# Patient Record
Sex: FEMALE
[De-identification: names, ages, dates, MRNs, and addresses within clinical notes are randomized per-mention and may not be internally consistent; named-entity substitution may affect disease eponyms.]

---

## 2024-04-25 PROBLEM — Z00.00 ENCOUNTER FOR PREVENTIVE HEALTH EXAMINATION: Status: ACTIVE | Noted: 2024-04-25

## 2024-05-08 PROBLEM — R51.9 FACIAL PAIN, ACUTE: Status: ACTIVE | Noted: 2024-05-08

## 2024-05-08 PROBLEM — M89.8X8 MASS OF PETROUS TEMPORAL BONE: Status: ACTIVE | Noted: 2024-05-08

## 2024-05-08 RX ORDER — LEVOTHYROXINE SODIUM 137 UG/1
TABLET ORAL
Refills: 0 | Status: ACTIVE | COMMUNITY

## 2024-05-08 RX ORDER — GABAPENTIN 300 MG/1
300 CAPSULE ORAL
Refills: 0 | Status: ACTIVE | COMMUNITY

## 2024-05-08 RX ORDER — NORETHINDRONE ACETATE AND ETHINYL ESTRADIOL AND FERROUS FUMARATE 1MG-20(21)
KIT ORAL
Refills: 0 | Status: ACTIVE | COMMUNITY

## 2024-05-08 RX ORDER — MULTIVITAMIN
TABLET ORAL
Refills: 0 | Status: ACTIVE | COMMUNITY

## 2024-05-08 RX ORDER — ESCITALOPRAM OXALATE 5 MG/1
TABLET, FILM COATED ORAL
Refills: 0 | Status: ACTIVE | COMMUNITY

## 2024-05-09 ENCOUNTER — APPOINTMENT (OUTPATIENT)
Dept: NEUROSURGERY | Facility: CLINIC | Age: 59
End: 2024-05-09
Payer: COMMERCIAL

## 2024-05-09 VITALS
DIASTOLIC BLOOD PRESSURE: 77 MMHG | HEIGHT: 64 IN | WEIGHT: 126 LBS | SYSTOLIC BLOOD PRESSURE: 139 MMHG | OXYGEN SATURATION: 98 % | BODY MASS INDEX: 21.51 KG/M2 | RESPIRATION RATE: 18 BRPM | HEART RATE: 63 BPM

## 2024-05-09 DIAGNOSIS — K13.79 OTHER LESIONS OF ORAL MUCOSA: ICD-10-CM

## 2024-05-09 DIAGNOSIS — M89.8X8 OTHER SPECIFIED DISORDERS OF BONE, OTHER SITE: ICD-10-CM

## 2024-05-09 DIAGNOSIS — K14.8 OTHER DISEASES OF TONGUE: ICD-10-CM

## 2024-05-09 DIAGNOSIS — R51.9 HEADACHE, UNSPECIFIED: ICD-10-CM

## 2024-05-09 PROCEDURE — 99203 OFFICE O/P NEW LOW 30 MIN: CPT

## 2024-05-13 NOTE — ASSESSMENT
[FreeTextEntry1] : I, Dr. Mcbride, personally performed the evaluation and management (E/M) services for this new patient who presents today with (known left petrous apex effusion/cyst and left facial pain. That E/M includes conducting the examination, assessing all new/exacerbated conditions, and establishing a new plan of care. Today, my ACP, Purnima Harper, was here to observe my evaluation and management services for this new problem/exacerbated condition to be followed going forward.  PAIN: -  Surgery to drain fluid discussed, risk and benefits reviewed and all questions answered - pt verbalized understand of surgical option - PST and CHG reviewed and provided

## 2024-05-13 NOTE — HISTORY OF PRESENT ILLNESS
[de-identified] : The patient is a 59 year old female who became having acute left sided constant HA involving her left ear, jaw and tongue in 2021. She was evaluated by ENT and Neurology and tried various pain medications and one day the pain just stopped. The same pain returned in February of this year. She describes it as a persistent dull ache. Chewing, hot or cold does not make it worse. Neurontin was started and has lessened the pain. She also states her left facial coordination feels off.  In 2021: MRA: decreased T1 signal of the medical basal gangliga 2/2024: MRI IAC and CT Temporal bones demonstrated chronic left pertrous apex effusion

## 2024-05-13 NOTE — REASON FOR VISIT
[New Patient Visit] : a new patient visit [Friend] : friend [FreeTextEntry1] : left sided HA, ear and jaw pain left pertrous apex effusion-chronic

## 2024-05-13 NOTE — ADDENDUM
[FreeTextEntry1] :   Patient Name: REINALDO YI   Chief Complaint (CC):   - Patient reported continuous headache, specifically on the left side of the head.   History of Present Illness:   -  Patient, 59 years old, reported having headaches starting from March 2021 to around September or October 2021. The headaches were particularly concentrated on the left side of her head. She sought medical consultation after a month of suffering from the headache, thinking initially that it was due to allergies. During this time an MRI scan revealed a cystic mass behind the patient's temple. The patient had been managing the pain with Aleve and recently started taking Gabapentin, where she noticed a reduction in the pain. From February 2021, she noticed a recurrence of the headache and had a noticeable change in her tongue and face on the left side with occasional numbness and difficulty in chewing or swallowing. The patient reported no loss of hearing. Her occupation involves giving presentations, during which she's noticed difficulty in speaking due to the pain. The current principal concern from the patient is the numbness and weakness which makes chewing hard.   Past Medical History (PMH):   - Patient has a reported history of Fibromyalgia which she currently manages. Moreover, she had a colonoscopy and ablation done in the past.   Family History (FH):   - The patient reported that her  passed away last summer after a long sickness. She has 2 children, one of which is in medical school.   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 She lives alone after the passing of her . It appears she hasn't had much significant disruption in her lifestyle and carries on with her professional duties.   Medications:   - Patient is currently taking Fibromyalgia medication, Lexapro, Synthroid and a hormone replacement for menopause. She has been managing her headaches with Aleve and was recently prescribed Gabapentin.   Allergies:   - None were stated during the conversation.   Review of Systems (ROS):   - Patient reported constant headaches, primarily on the left side of her head, as well as occasional numbness and difficulty in chewing or swallowing.   Physical Examination (PE):   - The physical examination shows that the patient's fifth cranial nerve is normal. The patient has total sensation in her face and productive movement. However, her tongue is atrophic on the left and her palate elevates lesser on the left.   Laboratory/Data Review:   - An MRI scan revealed a cystic mass behind the patient's temple. It has grown 30% larger over the last 3 years and is beginning to put pressure on the brainstem.   Assessment:   - The patient has a benign cystic mass that has shown growth over the last 3 years. The mass is affecting the patient's tongue and causing discomfort in the palate. The condition is leading to ongoing headaches and discomfort in eating or talking. The continued growth of the mass could lead to more severe symptoms.   Plan:   - The patient's treatment plan should ideally focus on the mass. If her symptoms persist or worsen, surgery to explore and possibly decompress the mass should be considered. This option should be weighed against the risks of a brain operation and the possibility that the surgery itself could potentially worsen the patient's condition. The ultimate decision will rely on the patient's choice. In the meantime, the patient should continue to manage her pain with prescribed medication as needed.   Preventive Health:   - Regular monitoring of the cystic mass is advisable to prevent any possible complications if it continues to grow. Additionally, regular check-ups to gauge headache severity and any potential new symptoms are recommended.   Patient Name: REINALDO YI   Chief Complaint (CC):   - Patient arrived with concerns about the noticeable change in the form of her tongue and increasingly severe headache episodes.   History of Present Illness:   - The patient, a woman in her 50s, presented with a significant change in the formation of her tongue and severe headache episodes. These symptoms have progressively worsened and have been ongoing for a very long time. She has a history of recent health and emotional issues related to her condition. Her recent medical interventions include a few sets of MRI scans. The patient's condition appears to be the result of a benign cystic growth at the base of the skull, which is impacting her 12th cranial nerve, and potentially, her 9th, 10th, and 11th cranial nerves.   Past Medical History (PMH):   - Not available in the conversation provided.   Family History (FH):   - Not available in the conversation provided.   Social History (SH):   - Patient may likely be working, seeing as the possibility of working virtually post-surgery was discussed. She also has children.   Medications:   - Not available in the conversation provided.   Allergies:   - Not available in the conversation provided.   Review of Systems (ROS):   - Not available in the conversation provided.   Physical Examination (PE):   - Upon examination, I noted that the patient's tongue appeared corrugated. Using the MRI images, I identified a cystic growth at the base of her skull, which is likely causing displacement of her vertebral artery and the medulla of her brainstem.   Laboratory/Data Review:   - An MRI scan of the patient's brain noted a growth, likely a cyst or benign tumor, located near the base of the skull. This growth seems to be closely associated with the patient's 12th cranial nerve.   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   Plan:   - Due to the risk of further cranial nerve damage and the fact that the tumor is already impacting the patient's quality of life, I suggested the removal of the growth. Though I proposed having an MRI in a year's time to monitor the growth, given the presence of cranial neuropathy, immediate action might be more beneficial. When we decide to have the surgery, it will be a retrosigmoid approach, in which we will attempt to preserve the cranial nerves and remove as much of the tumor as possible. Postoperatively, I would advise the patient to take time off to rest and continue to follow up for monitoring.   Preventive Health:   - Not available in the conversation provided.

## 2024-05-13 NOTE — PHYSICAL EXAM
[Oriented To Time, Place, And Person] : oriented to person, place, and time [Person] : oriented to person [Place] : oriented to place [Time] : oriented to time [Short Term Intact] : short term memory intact [Remote Intact] : remote memory intact [Registration Intact] : recent registration memory intact [Span Intact] : the attention span was normal [Concentration Intact] : normal concentrating ability [Visual Intact] : visual attention was ~T not ~L decreased [Fluency] : fluency intact [Comprehension] : comprehension intact [Reading] : reading intact [Current Events] : adequate knowledge of current events [Past History] : adequate knowledge of personal past history [Vocabulary] : adequate range of vocabulary [Cranial Nerves Optic (II)] : visual acuity intact bilaterally,  pupils equal round and reactive to light [Cranial Nerves Oculomotor (III)] : extraocular motion intact [Cranial Nerves Trigeminal (V)] : facial sensation intact symmetrically [Cranial Nerves Facial (VII)] : face symmetrical [Cranial Nerves Vestibulocochlear (VIII)] : hearing was intact bilaterally [Cranial Nerves Accessory (XI - Cranial And Spinal)] : head turning and shoulder shrug symmetric [Cranial Nerves Hypoglossal (XII)] : there was no tongue deviation with protrusion [Motor Tone] : muscle tone was normal in all four extremities [Motor Strength] : muscle strength was normal in all four extremities [Involuntary Movements] : no involuntary movements were seen [No Muscle Atrophy] : normal bulk in all four extremities [Sensation Tactile Decrease] : light touch was intact [Sensation Pain / Temperature Decrease] : pain and temperature was intact [Abnormal Walk] : normal gait [Balance] : balance was intact [FreeTextEntry5] : tongue atrophic on the left AND palate elevates less on the left

## 2024-05-20 ENCOUNTER — NON-APPOINTMENT (OUTPATIENT)
Age: 59
End: 2024-05-20

## 2024-06-11 ENCOUNTER — APPOINTMENT (OUTPATIENT)
Dept: MRI IMAGING | Facility: HOSPITAL | Age: 59
End: 2024-06-11

## 2024-06-11 ENCOUNTER — OUTPATIENT (OUTPATIENT)
Dept: OUTPATIENT SERVICES | Facility: HOSPITAL | Age: 59
LOS: 1 days | End: 2024-06-11
Payer: COMMERCIAL

## 2024-06-11 PROCEDURE — A9585: CPT

## 2024-06-11 PROCEDURE — 70553 MRI BRAIN STEM W/O & W/DYE: CPT

## 2024-06-11 PROCEDURE — 70553 MRI BRAIN STEM W/O & W/DYE: CPT | Mod: 26

## 2024-06-17 VITALS
HEART RATE: 61 BPM | WEIGHT: 131.18 LBS | TEMPERATURE: 98 F | HEIGHT: 64 IN | OXYGEN SATURATION: 100 % | RESPIRATION RATE: 16 BRPM | SYSTOLIC BLOOD PRESSURE: 117 MMHG | DIASTOLIC BLOOD PRESSURE: 75 MMHG

## 2024-06-18 ENCOUNTER — APPOINTMENT (OUTPATIENT)
Dept: NEUROSURGERY | Facility: HOSPITAL | Age: 59
End: 2024-06-18

## 2024-06-18 ENCOUNTER — TRANSCRIPTION ENCOUNTER (OUTPATIENT)
Age: 59
End: 2024-06-18

## 2024-06-18 PROCEDURE — XXXXX: CPT | Mod: 1L

## 2024-06-18 RX ORDER — POVIDONE-IODINE
1 FLAKES (GRAM) MISCELLANEOUS ONCE
Refills: 0 | Status: COMPLETED | OUTPATIENT
Start: 2024-06-19 | End: 2024-06-19

## 2024-06-19 ENCOUNTER — RESULT REVIEW (OUTPATIENT)
Age: 59
End: 2024-06-19

## 2024-06-19 ENCOUNTER — INPATIENT (INPATIENT)
Facility: HOSPITAL | Age: 59
LOS: 1 days | Discharge: HOME | DRG: 27 | End: 2024-06-21
Attending: NEUROLOGICAL SURGERY | Admitting: NEUROLOGICAL SURGERY
Payer: COMMERCIAL

## 2024-06-19 DIAGNOSIS — Z98.890 OTHER SPECIFIED POSTPROCEDURAL STATES: Chronic | ICD-10-CM

## 2024-06-19 LAB
ADD ON TEST-SPECIMEN IN LAB: SIGNIFICANT CHANGE UP
ANION GAP SERPL CALC-SCNC: 8 MMOL/L — SIGNIFICANT CHANGE UP (ref 5–17)
APTT BLD: 26.1 SEC — SIGNIFICANT CHANGE UP (ref 24.5–35.6)
BLD GP AB SCN SERPL QL: NEGATIVE — SIGNIFICANT CHANGE UP
BUN SERPL-MCNC: 15 MG/DL — SIGNIFICANT CHANGE UP (ref 7–23)
CALCIUM SERPL-MCNC: 8.7 MG/DL — SIGNIFICANT CHANGE UP (ref 8.4–10.5)
CHLORIDE SERPL-SCNC: 103 MMOL/L — SIGNIFICANT CHANGE UP (ref 96–108)
CO2 SERPL-SCNC: 24 MMOL/L — SIGNIFICANT CHANGE UP (ref 22–31)
CREAT SERPL-MCNC: 0.61 MG/DL — SIGNIFICANT CHANGE UP (ref 0.5–1.3)
EGFR: 104 ML/MIN/1.73M2 — SIGNIFICANT CHANGE UP
GLUCOSE SERPL-MCNC: 133 MG/DL — HIGH (ref 70–99)
HCT VFR BLD CALC: 32.8 % — LOW (ref 34.5–45)
HGB BLD-MCNC: 11.1 G/DL — LOW (ref 11.5–15.5)
INR BLD: 1 — SIGNIFICANT CHANGE UP (ref 0.85–1.18)
MAGNESIUM SERPL-MCNC: 2 MG/DL — SIGNIFICANT CHANGE UP (ref 1.6–2.6)
MCHC RBC-ENTMCNC: 32.6 PG — SIGNIFICANT CHANGE UP (ref 27–34)
MCHC RBC-ENTMCNC: 33.8 GM/DL — SIGNIFICANT CHANGE UP (ref 32–36)
MCV RBC AUTO: 96.2 FL — SIGNIFICANT CHANGE UP (ref 80–100)
NRBC # BLD: 0 /100 WBCS — SIGNIFICANT CHANGE UP (ref 0–0)
PHOSPHATE SERPL-MCNC: 4.5 MG/DL — SIGNIFICANT CHANGE UP (ref 2.5–4.5)
PLATELET # BLD AUTO: 161 K/UL — SIGNIFICANT CHANGE UP (ref 150–400)
POTASSIUM SERPL-MCNC: 4.2 MMOL/L — SIGNIFICANT CHANGE UP (ref 3.5–5.3)
POTASSIUM SERPL-SCNC: 4.2 MMOL/L — SIGNIFICANT CHANGE UP (ref 3.5–5.3)
PROTHROM AB SERPL-ACNC: 11.4 SEC — SIGNIFICANT CHANGE UP (ref 9.5–13)
RBC # BLD: 3.41 M/UL — LOW (ref 3.8–5.2)
RBC # FLD: 12.5 % — SIGNIFICANT CHANGE UP (ref 10.3–14.5)
RH IG SCN BLD-IMP: NEGATIVE — SIGNIFICANT CHANGE UP
SODIUM SERPL-SCNC: 135 MMOL/L — SIGNIFICANT CHANGE UP (ref 135–145)
WBC # BLD: 4.76 K/UL — SIGNIFICANT CHANGE UP (ref 3.8–10.5)
WBC # FLD AUTO: 4.76 K/UL — SIGNIFICANT CHANGE UP (ref 3.8–10.5)

## 2024-06-19 PROCEDURE — 61781 SCAN PROC CRANIAL INTRA: CPT

## 2024-06-19 PROCEDURE — 88307 TISSUE EXAM BY PATHOLOGIST: CPT | Mod: 26

## 2024-06-19 PROCEDURE — 99291 CRITICAL CARE FIRST HOUR: CPT

## 2024-06-19 PROCEDURE — 61520 REMOVAL OF BRAIN LESION: CPT

## 2024-06-19 DEVICE — DURASEAL: Type: IMPLANTABLE DEVICE | Status: FUNCTIONAL

## 2024-06-19 DEVICE — SURGCEL 4 X 8": Type: IMPLANTABLE DEVICE | Status: FUNCTIONAL

## 2024-06-19 DEVICE — SURGICEL FIBRILLAR 4 X 4": Type: IMPLANTABLE DEVICE | Status: FUNCTIONAL

## 2024-06-19 DEVICE — SURGIFOAM PAD 8CM X 12.5CM X 10MM (100): Type: IMPLANTABLE DEVICE | Status: FUNCTIONAL

## 2024-06-19 DEVICE — SURGIFLO HEMOSTATIC MATRIX KIT: Type: IMPLANTABLE DEVICE | Status: FUNCTIONAL

## 2024-06-19 DEVICE — MESH DYNAMIC 1.7MM 90X90X.3MM: Type: IMPLANTABLE DEVICE | Status: FUNCTIONAL

## 2024-06-19 DEVICE — SCREW UN3 AXS SELF DRILL 1.5X4MM: Type: IMPLANTABLE DEVICE | Status: FUNCTIONAL

## 2024-06-19 DEVICE — TACHOSIL 4.8 X 4.8CM: Type: IMPLANTABLE DEVICE | Status: FUNCTIONAL

## 2024-06-19 RX ORDER — SODIUM CHLORIDE 0.9 % (FLUSH) 0.9 %
500 SYRINGE (ML) INJECTION ONCE
Refills: 0 | Status: COMPLETED | OUTPATIENT
Start: 2024-06-19 | End: 2024-06-19

## 2024-06-19 RX ORDER — DEXTROSE 30 % IN WATER 30 %
12.5 VIAL (ML) INTRAVENOUS ONCE
Refills: 0 | Status: DISCONTINUED | OUTPATIENT
Start: 2024-06-19 | End: 2024-06-19

## 2024-06-19 RX ORDER — ACETAMINOPHEN 325 MG
1000 TABLET ORAL ONCE
Refills: 0 | Status: COMPLETED | OUTPATIENT
Start: 2024-06-19 | End: 2024-06-19

## 2024-06-19 RX ORDER — GLUCAGON HYDROCHLORIDE 1 MG/ML
1 INJECTION, POWDER, FOR SOLUTION INTRAMUSCULAR; INTRAVENOUS; SUBCUTANEOUS ONCE
Refills: 0 | Status: DISCONTINUED | OUTPATIENT
Start: 2024-06-19 | End: 2024-06-19

## 2024-06-19 RX ORDER — METHOCARBAMOL 500 MG
500 TABLET ORAL EVERY 8 HOURS
Refills: 0 | Status: DISCONTINUED | OUTPATIENT
Start: 2024-06-19 | End: 2024-06-21

## 2024-06-19 RX ORDER — CLINDAMYCIN PHOSPHATE 150 MG/ML
INJECTION, SOLUTION INTRAVENOUS
Refills: 0 | Status: COMPLETED | OUTPATIENT
Start: 2024-06-19 | End: 2024-06-19

## 2024-06-19 RX ORDER — ACETAMINOPHEN 325 MG
1000 TABLET ORAL EVERY 8 HOURS
Refills: 0 | Status: DISCONTINUED | OUTPATIENT
Start: 2024-06-19 | End: 2024-06-19

## 2024-06-19 RX ORDER — CLINDAMYCIN PHOSPHATE 150 MG/ML
900 INJECTION, SOLUTION INTRAVENOUS ONCE
Refills: 0 | Status: COMPLETED | OUTPATIENT
Start: 2024-06-19 | End: 2024-06-19

## 2024-06-19 RX ORDER — ONDANSETRON HYDROCHLORIDE 2 MG/ML
4 INJECTION INTRAMUSCULAR; INTRAVENOUS EVERY 6 HOURS
Refills: 0 | Status: DISCONTINUED | OUTPATIENT
Start: 2024-06-19 | End: 2024-06-19

## 2024-06-19 RX ORDER — LEVOTHYROXINE SODIUM 25 MCG
1 TABLET ORAL
Refills: 0 | DISCHARGE

## 2024-06-19 RX ORDER — ESCITALOPRAM OXALATE 20 MG/1
10 TABLET, FILM COATED ORAL DAILY
Refills: 0 | Status: DISCONTINUED | OUTPATIENT
Start: 2024-06-19 | End: 2024-06-21

## 2024-06-19 RX ORDER — ESCITALOPRAM OXALATE 20 MG/1
1 TABLET, FILM COATED ORAL
Refills: 0 | DISCHARGE

## 2024-06-19 RX ORDER — PANTOPRAZOLE SODIUM 40 MG/10ML
40 INJECTION, POWDER, FOR SOLUTION INTRAVENOUS DAILY
Refills: 0 | Status: DISCONTINUED | OUTPATIENT
Start: 2024-06-19 | End: 2024-06-19

## 2024-06-19 RX ORDER — BISACODYL 5 MG
5 TABLET, DELAYED RELEASE (ENTERIC COATED) ORAL DAILY
Refills: 0 | Status: DISCONTINUED | OUTPATIENT
Start: 2024-06-19 | End: 2024-06-21

## 2024-06-19 RX ORDER — LEVOTHYROXINE SODIUM 25 MCG
50 TABLET ORAL DAILY
Refills: 0 | Status: DISCONTINUED | OUTPATIENT
Start: 2024-06-19 | End: 2024-06-21

## 2024-06-19 RX ORDER — DEXTROSE 30 % IN WATER 30 %
15 VIAL (ML) INTRAVENOUS ONCE
Refills: 0 | Status: DISCONTINUED | OUTPATIENT
Start: 2024-06-19 | End: 2024-06-19

## 2024-06-19 RX ORDER — APREPITANT 125MG-80MG
40 KIT ORAL ONCE
Refills: 0 | Status: COMPLETED | OUTPATIENT
Start: 2024-06-19 | End: 2024-06-19

## 2024-06-19 RX ORDER — ESTRADIOL/NORETHINDRONE ACETATE 1; .5 MG/1; MG/1
1 TABLET, FILM COATED ORAL
Refills: 0 | DISCHARGE

## 2024-06-19 RX ORDER — CLINDAMYCIN PHOSPHATE 150 MG/ML
900 INJECTION, SOLUTION INTRAVENOUS EVERY 8 HOURS
Refills: 0 | Status: COMPLETED | OUTPATIENT
Start: 2024-06-19 | End: 2024-06-19

## 2024-06-19 RX ORDER — DEXTROSE MONOHYDRATE AND SODIUM CHLORIDE 5; .3 G/100ML; G/100ML
1000 INJECTION, SOLUTION INTRAVENOUS
Refills: 0 | Status: DISCONTINUED | OUTPATIENT
Start: 2024-06-19 | End: 2024-06-19

## 2024-06-19 RX ORDER — SODIUM CHLORIDE 0.9 % (FLUSH) 0.9 %
1000 SYRINGE (ML) INJECTION
Refills: 0 | Status: DISCONTINUED | OUTPATIENT
Start: 2024-06-19 | End: 2024-06-20

## 2024-06-19 RX ORDER — METOCLOPRAMIDE 5 MG/5ML
10 SOLUTION ORAL ONCE
Refills: 0 | Status: COMPLETED | OUTPATIENT
Start: 2024-06-19 | End: 2024-06-19

## 2024-06-19 RX ORDER — DEXTROSE MONOHYDRATE 100 MG/ML
125 INJECTION, SOLUTION INTRAVENOUS ONCE
Refills: 0 | Status: DISCONTINUED | OUTPATIENT
Start: 2024-06-19 | End: 2024-06-19

## 2024-06-19 RX ORDER — METOCLOPRAMIDE 5 MG/5ML
10 SOLUTION ORAL EVERY 8 HOURS
Refills: 0 | Status: DISCONTINUED | OUTPATIENT
Start: 2024-06-19 | End: 2024-06-21

## 2024-06-19 RX ORDER — SODIUM CHLORIDE 0.9 % (FLUSH) 0.9 %
1000 SYRINGE (ML) INJECTION
Refills: 0 | Status: DISCONTINUED | OUTPATIENT
Start: 2024-06-19 | End: 2024-06-19

## 2024-06-19 RX ORDER — ONDANSETRON HYDROCHLORIDE 2 MG/ML
4 INJECTION INTRAMUSCULAR; INTRAVENOUS EVERY 6 HOURS
Refills: 0 | Status: COMPLETED | OUTPATIENT
Start: 2024-06-19 | End: 2024-06-21

## 2024-06-19 RX ORDER — ACETAMINOPHEN 325 MG
1000 TABLET ORAL EVERY 8 HOURS
Refills: 0 | Status: DISCONTINUED | OUTPATIENT
Start: 2024-06-20 | End: 2024-06-21

## 2024-06-19 RX ORDER — DEXTROSE 30 % IN WATER 30 %
25 VIAL (ML) INTRAVENOUS ONCE
Refills: 0 | Status: DISCONTINUED | OUTPATIENT
Start: 2024-06-19 | End: 2024-06-19

## 2024-06-19 RX ORDER — KETOROLAC TROMETHAMINE 30 MG/ML
15 INJECTION, SOLUTION INTRAMUSCULAR EVERY 6 HOURS
Refills: 0 | Status: DISCONTINUED | OUTPATIENT
Start: 2024-06-19 | End: 2024-06-21

## 2024-06-19 RX ORDER — DIAZEPAM 10 MG/1
2 TABLET ORAL ONCE
Refills: 0 | Status: DISCONTINUED | OUTPATIENT
Start: 2024-06-19 | End: 2024-06-19

## 2024-06-19 RX ORDER — SENNOSIDES 8.6 MG
2 TABLET ORAL AT BEDTIME
Refills: 0 | Status: DISCONTINUED | OUTPATIENT
Start: 2024-06-19 | End: 2024-06-21

## 2024-06-19 RX ADMIN — DIAZEPAM 2 MILLIGRAM(S): 10 TABLET ORAL at 19:56

## 2024-06-19 RX ADMIN — Medication 1000 MILLIGRAM(S): at 22:24

## 2024-06-19 RX ADMIN — METOCLOPRAMIDE 10 MILLIGRAM(S): 5 SOLUTION ORAL at 15:06

## 2024-06-19 RX ADMIN — Medication 500 MILLILITER(S): at 14:27

## 2024-06-19 RX ADMIN — Medication 2 TABLET(S): at 22:10

## 2024-06-19 RX ADMIN — Medication 400 MILLIGRAM(S): at 22:09

## 2024-06-19 RX ADMIN — Medication 1000 MILLIGRAM(S): at 15:12

## 2024-06-19 RX ADMIN — APREPITANT 40 MILLIGRAM(S): KIT at 06:32

## 2024-06-19 RX ADMIN — CLINDAMYCIN PHOSPHATE 100 MILLIGRAM(S): 150 INJECTION, SOLUTION INTRAVENOUS at 16:52

## 2024-06-19 RX ADMIN — Medication 1000 MILLIGRAM(S): at 06:31

## 2024-06-19 RX ADMIN — Medication 400 MILLIGRAM(S): at 14:34

## 2024-06-19 RX ADMIN — Medication 1000 MILLILITER(S): at 15:06

## 2024-06-19 RX ADMIN — Medication 75 MILLILITER(S): at 14:00

## 2024-06-19 RX ADMIN — Medication 1 APPLICATION(S): at 06:41

## 2024-06-19 RX ADMIN — CLINDAMYCIN PHOSPHATE 100 MILLIGRAM(S): 150 INJECTION, SOLUTION INTRAVENOUS at 22:09

## 2024-06-19 RX ADMIN — Medication 500 MILLIGRAM(S): at 22:09

## 2024-06-19 RX ADMIN — ONDANSETRON HYDROCHLORIDE 4 MILLIGRAM(S): 2 INJECTION INTRAMUSCULAR; INTRAVENOUS at 23:49

## 2024-06-19 RX ADMIN — Medication 1 APPLICATION(S): at 06:25

## 2024-06-20 LAB
A1C WITH ESTIMATED AVERAGE GLUCOSE RESULT: 5.4 % — SIGNIFICANT CHANGE UP (ref 4–5.6)
ANION GAP SERPL CALC-SCNC: 8 MMOL/L — SIGNIFICANT CHANGE UP (ref 5–17)
BUN SERPL-MCNC: 14 MG/DL — SIGNIFICANT CHANGE UP (ref 7–23)
CALCIUM SERPL-MCNC: 8.4 MG/DL — SIGNIFICANT CHANGE UP (ref 8.4–10.5)
CHLORIDE SERPL-SCNC: 104 MMOL/L — SIGNIFICANT CHANGE UP (ref 96–108)
CO2 SERPL-SCNC: 23 MMOL/L — SIGNIFICANT CHANGE UP (ref 22–31)
CREAT SERPL-MCNC: 0.58 MG/DL — SIGNIFICANT CHANGE UP (ref 0.5–1.3)
EGFR: 105 ML/MIN/1.73M2 — SIGNIFICANT CHANGE UP
ESTIMATED AVERAGE GLUCOSE: 108 MG/DL — SIGNIFICANT CHANGE UP (ref 68–114)
GLUCOSE SERPL-MCNC: 112 MG/DL — HIGH (ref 70–99)
HCT VFR BLD CALC: 32.5 % — LOW (ref 34.5–45)
HGB BLD-MCNC: 11.2 G/DL — LOW (ref 11.5–15.5)
MAGNESIUM SERPL-MCNC: 1.8 MG/DL — SIGNIFICANT CHANGE UP (ref 1.6–2.6)
MCHC RBC-ENTMCNC: 32.9 PG — SIGNIFICANT CHANGE UP (ref 27–34)
MCHC RBC-ENTMCNC: 34.5 GM/DL — SIGNIFICANT CHANGE UP (ref 32–36)
MCV RBC AUTO: 95.6 FL — SIGNIFICANT CHANGE UP (ref 80–100)
NRBC # BLD: 0 /100 WBCS — SIGNIFICANT CHANGE UP (ref 0–0)
PHOSPHATE SERPL-MCNC: 4.3 MG/DL — SIGNIFICANT CHANGE UP (ref 2.5–4.5)
PLATELET # BLD AUTO: 158 K/UL — SIGNIFICANT CHANGE UP (ref 150–400)
POTASSIUM SERPL-MCNC: 3.9 MMOL/L — SIGNIFICANT CHANGE UP (ref 3.5–5.3)
POTASSIUM SERPL-SCNC: 3.9 MMOL/L — SIGNIFICANT CHANGE UP (ref 3.5–5.3)
RBC # BLD: 3.4 M/UL — LOW (ref 3.8–5.2)
RBC # FLD: 12.6 % — SIGNIFICANT CHANGE UP (ref 10.3–14.5)
SODIUM SERPL-SCNC: 135 MMOL/L — SIGNIFICANT CHANGE UP (ref 135–145)
WBC # BLD: 7.42 K/UL — SIGNIFICANT CHANGE UP (ref 3.8–10.5)
WBC # FLD AUTO: 7.42 K/UL — SIGNIFICANT CHANGE UP (ref 3.8–10.5)

## 2024-06-20 PROCEDURE — 70450 CT HEAD/BRAIN W/O DYE: CPT | Mod: 26

## 2024-06-20 RX ORDER — MAGNESIUM SULFATE 100 %
2 POWDER (GRAM) MISCELLANEOUS ONCE
Refills: 0 | Status: COMPLETED | OUTPATIENT
Start: 2024-06-20 | End: 2024-06-20

## 2024-06-20 RX ORDER — ENOXAPARIN SODIUM 100 MG/ML
40 INJECTION SUBCUTANEOUS
Refills: 0 | Status: DISCONTINUED | OUTPATIENT
Start: 2024-06-20 | End: 2024-06-21

## 2024-06-20 RX ORDER — DIAZEPAM 10 MG/1
2 TABLET ORAL ONCE
Refills: 0 | Status: DISCONTINUED | OUTPATIENT
Start: 2024-06-20 | End: 2024-06-21

## 2024-06-20 RX ORDER — POTASSIUM CHLORIDE 600 MG/1
20 TABLET, FILM COATED, EXTENDED RELEASE ORAL ONCE
Refills: 0 | Status: COMPLETED | OUTPATIENT
Start: 2024-06-20 | End: 2024-06-20

## 2024-06-20 RX ADMIN — KETOROLAC TROMETHAMINE 15 MILLIGRAM(S): 30 INJECTION, SOLUTION INTRAMUSCULAR at 21:08

## 2024-06-20 RX ADMIN — KETOROLAC TROMETHAMINE 15 MILLIGRAM(S): 30 INJECTION, SOLUTION INTRAMUSCULAR at 22:02

## 2024-06-20 RX ADMIN — Medication 500 MILLIGRAM(S): at 06:16

## 2024-06-20 RX ADMIN — Medication 1000 MILLIGRAM(S): at 06:16

## 2024-06-20 RX ADMIN — KETOROLAC TROMETHAMINE 15 MILLIGRAM(S): 30 INJECTION, SOLUTION INTRAMUSCULAR at 13:58

## 2024-06-20 RX ADMIN — POTASSIUM CHLORIDE 20 MILLIEQUIVALENT(S): 600 TABLET, FILM COATED, EXTENDED RELEASE ORAL at 07:07

## 2024-06-20 RX ADMIN — Medication 50 MICROGRAM(S): at 06:16

## 2024-06-20 RX ADMIN — ONDANSETRON HYDROCHLORIDE 4 MILLIGRAM(S): 2 INJECTION INTRAMUSCULAR; INTRAVENOUS at 06:16

## 2024-06-20 RX ADMIN — ONDANSETRON HYDROCHLORIDE 4 MILLIGRAM(S): 2 INJECTION INTRAMUSCULAR; INTRAVENOUS at 18:14

## 2024-06-20 RX ADMIN — ONDANSETRON HYDROCHLORIDE 4 MILLIGRAM(S): 2 INJECTION INTRAMUSCULAR; INTRAVENOUS at 23:20

## 2024-06-20 RX ADMIN — ENOXAPARIN SODIUM 40 MILLIGRAM(S): 100 INJECTION SUBCUTANEOUS at 21:07

## 2024-06-20 RX ADMIN — Medication 1000 MILLIGRAM(S): at 13:58

## 2024-06-20 RX ADMIN — Medication 25 GRAM(S): at 07:07

## 2024-06-20 RX ADMIN — Medication 1000 MILLIGRAM(S): at 14:58

## 2024-06-20 RX ADMIN — KETOROLAC TROMETHAMINE 15 MILLIGRAM(S): 30 INJECTION, SOLUTION INTRAMUSCULAR at 14:13

## 2024-06-20 RX ADMIN — Medication 1000 MILLIGRAM(S): at 06:33

## 2024-06-20 RX ADMIN — ESCITALOPRAM OXALATE 10 MILLIGRAM(S): 20 TABLET, FILM COATED ORAL at 11:32

## 2024-06-20 RX ADMIN — Medication 500 MILLIGRAM(S): at 21:08

## 2024-06-20 RX ADMIN — ONDANSETRON HYDROCHLORIDE 4 MILLIGRAM(S): 2 INJECTION INTRAMUSCULAR; INTRAVENOUS at 11:32

## 2024-06-20 RX ADMIN — Medication 1000 MILLIGRAM(S): at 21:08

## 2024-06-20 RX ADMIN — Medication 1 APPLICATION(S): at 06:18

## 2024-06-20 RX ADMIN — Medication 500 MILLIGRAM(S): at 13:58

## 2024-06-21 ENCOUNTER — TRANSCRIPTION ENCOUNTER (OUTPATIENT)
Age: 59
End: 2024-06-21

## 2024-06-21 VITALS
HEART RATE: 86 BPM | DIASTOLIC BLOOD PRESSURE: 72 MMHG | SYSTOLIC BLOOD PRESSURE: 131 MMHG | OXYGEN SATURATION: 98 % | TEMPERATURE: 98 F | RESPIRATION RATE: 18 BRPM

## 2024-06-21 DIAGNOSIS — M89.9 DISORDER OF BONE, UNSPECIFIED: ICD-10-CM

## 2024-06-21 PROBLEM — I34.1 NONRHEUMATIC MITRAL (VALVE) PROLAPSE: Chronic | Status: ACTIVE | Noted: 2024-06-17

## 2024-06-21 PROBLEM — M19.90 UNSPECIFIED OSTEOARTHRITIS, UNSPECIFIED SITE: Chronic | Status: ACTIVE | Noted: 2024-06-17

## 2024-06-21 PROBLEM — E03.9 HYPOTHYROIDISM, UNSPECIFIED: Chronic | Status: ACTIVE | Noted: 2024-06-17

## 2024-06-21 PROBLEM — Z87.898 PERSONAL HISTORY OF OTHER SPECIFIED CONDITIONS: Chronic | Status: ACTIVE | Noted: 2024-06-17

## 2024-06-21 PROBLEM — G50.1 ATYPICAL FACIAL PAIN: Chronic | Status: ACTIVE | Noted: 2024-06-17

## 2024-06-21 PROBLEM — N80.9 ENDOMETRIOSIS, UNSPECIFIED: Chronic | Status: ACTIVE | Noted: 2024-06-19

## 2024-06-21 LAB
ANION GAP SERPL CALC-SCNC: 7 MMOL/L — SIGNIFICANT CHANGE UP (ref 5–17)
BUN SERPL-MCNC: 11 MG/DL — SIGNIFICANT CHANGE UP (ref 7–23)
CALCIUM SERPL-MCNC: 8.8 MG/DL — SIGNIFICANT CHANGE UP (ref 8.4–10.5)
CHLORIDE SERPL-SCNC: 105 MMOL/L — SIGNIFICANT CHANGE UP (ref 96–108)
CO2 SERPL-SCNC: 26 MMOL/L — SIGNIFICANT CHANGE UP (ref 22–31)
CREAT SERPL-MCNC: 0.63 MG/DL — SIGNIFICANT CHANGE UP (ref 0.5–1.3)
EGFR: 103 ML/MIN/1.73M2 — SIGNIFICANT CHANGE UP
GLUCOSE SERPL-MCNC: 121 MG/DL — HIGH (ref 70–99)
HCT VFR BLD CALC: 36.4 % — SIGNIFICANT CHANGE UP (ref 34.5–45)
HGB BLD-MCNC: 11.8 G/DL — SIGNIFICANT CHANGE UP (ref 11.5–15.5)
MAGNESIUM SERPL-MCNC: 2.1 MG/DL — SIGNIFICANT CHANGE UP (ref 1.6–2.6)
MCHC RBC-ENTMCNC: 32.1 PG — SIGNIFICANT CHANGE UP (ref 27–34)
MCHC RBC-ENTMCNC: 32.4 GM/DL — SIGNIFICANT CHANGE UP (ref 32–36)
MCV RBC AUTO: 98.9 FL — SIGNIFICANT CHANGE UP (ref 80–100)
NRBC # BLD: 0 /100 WBCS — SIGNIFICANT CHANGE UP (ref 0–0)
PHOSPHATE SERPL-MCNC: 2.7 MG/DL — SIGNIFICANT CHANGE UP (ref 2.5–4.5)
PLATELET # BLD AUTO: 169 K/UL — SIGNIFICANT CHANGE UP (ref 150–400)
POTASSIUM SERPL-MCNC: 4 MMOL/L — SIGNIFICANT CHANGE UP (ref 3.5–5.3)
POTASSIUM SERPL-SCNC: 4 MMOL/L — SIGNIFICANT CHANGE UP (ref 3.5–5.3)
RBC # BLD: 3.68 M/UL — LOW (ref 3.8–5.2)
RBC # FLD: 12.5 % — SIGNIFICANT CHANGE UP (ref 10.3–14.5)
SODIUM SERPL-SCNC: 138 MMOL/L — SIGNIFICANT CHANGE UP (ref 135–145)
WBC # BLD: 5.57 K/UL — SIGNIFICANT CHANGE UP (ref 3.8–10.5)
WBC # FLD AUTO: 5.57 K/UL — SIGNIFICANT CHANGE UP (ref 3.8–10.5)

## 2024-06-21 PROCEDURE — C1889: CPT

## 2024-06-21 PROCEDURE — 86900 BLOOD TYPING SEROLOGIC ABO: CPT

## 2024-06-21 PROCEDURE — 86901 BLOOD TYPING SEROLOGIC RH(D): CPT

## 2024-06-21 PROCEDURE — 80048 BASIC METABOLIC PNL TOTAL CA: CPT

## 2024-06-21 PROCEDURE — 83036 HEMOGLOBIN GLYCOSYLATED A1C: CPT

## 2024-06-21 PROCEDURE — 99024 POSTOP FOLLOW-UP VISIT: CPT

## 2024-06-21 PROCEDURE — 99222 1ST HOSP IP/OBS MODERATE 55: CPT

## 2024-06-21 PROCEDURE — 85027 COMPLETE CBC AUTOMATED: CPT

## 2024-06-21 PROCEDURE — 86850 RBC ANTIBODY SCREEN: CPT

## 2024-06-21 PROCEDURE — 88307 TISSUE EXAM BY PATHOLOGIST: CPT

## 2024-06-21 PROCEDURE — 86923 COMPATIBILITY TEST ELECTRIC: CPT

## 2024-06-21 PROCEDURE — 83735 ASSAY OF MAGNESIUM: CPT

## 2024-06-21 PROCEDURE — 36415 COLL VENOUS BLD VENIPUNCTURE: CPT

## 2024-06-21 PROCEDURE — 85610 PROTHROMBIN TIME: CPT

## 2024-06-21 PROCEDURE — C1713: CPT

## 2024-06-21 PROCEDURE — 85730 THROMBOPLASTIN TIME PARTIAL: CPT

## 2024-06-21 PROCEDURE — 70450 CT HEAD/BRAIN W/O DYE: CPT | Mod: MC

## 2024-06-21 PROCEDURE — 84100 ASSAY OF PHOSPHORUS: CPT

## 2024-06-21 RX ORDER — ONDANSETRON HYDROCHLORIDE 2 MG/ML
1 INJECTION INTRAMUSCULAR; INTRAVENOUS
Qty: 42 | Refills: 0
Start: 2024-06-21 | End: 2024-07-04

## 2024-06-21 RX ORDER — SENNOSIDES 8.6 MG
2 TABLET ORAL
Qty: 0 | Refills: 0 | DISCHARGE
Start: 2024-06-21

## 2024-06-21 RX ORDER — DIAZEPAM 10 MG/1
2 TABLET ORAL EVERY 8 HOURS
Refills: 0 | Status: DISCONTINUED | OUTPATIENT
Start: 2024-06-21 | End: 2024-06-21

## 2024-06-21 RX ORDER — HYDRALAZINE HYDROCHLORIDE 50 MG/1
10 TABLET ORAL ONCE
Refills: 0 | Status: COMPLETED | OUTPATIENT
Start: 2024-06-21 | End: 2024-06-21

## 2024-06-21 RX ORDER — TRAMADOL HYDROCHLORIDE 50 MG/1
1 TABLET, FILM COATED ORAL
Qty: 28 | Refills: 0
Start: 2024-06-21 | End: 2024-06-27

## 2024-06-21 RX ORDER — NALOXONE HYDROCHLORIDE 1 MG/ML
1 INJECTION PARENTERAL
Qty: 1 | Refills: 0
Start: 2024-06-21 | End: 2024-06-21

## 2024-06-21 RX ORDER — DIAZEPAM 10 MG/1
1 TABLET ORAL
Qty: 28 | Refills: 0
Start: 2024-06-21 | End: 2024-06-27

## 2024-06-21 RX ORDER — ACETAMINOPHEN 325 MG
2 TABLET ORAL
Qty: 0 | Refills: 0 | DISCHARGE
Start: 2024-06-21

## 2024-06-21 RX ADMIN — ONDANSETRON HYDROCHLORIDE 4 MILLIGRAM(S): 2 INJECTION INTRAMUSCULAR; INTRAVENOUS at 13:51

## 2024-06-21 RX ADMIN — HYDRALAZINE HYDROCHLORIDE 10 MILLIGRAM(S): 50 TABLET ORAL at 06:55

## 2024-06-21 RX ADMIN — Medication 500 MILLIGRAM(S): at 06:20

## 2024-06-21 RX ADMIN — KETOROLAC TROMETHAMINE 15 MILLIGRAM(S): 30 INJECTION, SOLUTION INTRAMUSCULAR at 04:29

## 2024-06-21 RX ADMIN — Medication 1000 MILLIGRAM(S): at 13:50

## 2024-06-21 RX ADMIN — ONDANSETRON HYDROCHLORIDE 4 MILLIGRAM(S): 2 INJECTION INTRAMUSCULAR; INTRAVENOUS at 06:21

## 2024-06-21 RX ADMIN — ESCITALOPRAM OXALATE 10 MILLIGRAM(S): 20 TABLET, FILM COATED ORAL at 13:50

## 2024-06-21 RX ADMIN — KETOROLAC TROMETHAMINE 15 MILLIGRAM(S): 30 INJECTION, SOLUTION INTRAMUSCULAR at 05:10

## 2024-06-21 RX ADMIN — Medication 1000 MILLIGRAM(S): at 06:20

## 2024-06-21 RX ADMIN — Medication 50 MICROGRAM(S): at 06:20

## 2024-06-21 RX ADMIN — METOCLOPRAMIDE 10 MILLIGRAM(S): 5 SOLUTION ORAL at 04:21

## 2024-06-24 ENCOUNTER — NON-APPOINTMENT (OUTPATIENT)
Age: 59
End: 2024-06-24

## 2024-06-26 LAB — SURGICAL PATHOLOGY STUDY: SIGNIFICANT CHANGE UP

## 2024-06-27 PROBLEM — Z87.39 PERSONAL HISTORY OF OTHER DISEASES OF THE MUSCULOSKELETAL SYSTEM AND CONNECTIVE TISSUE: Chronic | Status: ACTIVE | Noted: 2024-06-17

## 2024-06-27 PROBLEM — L30.9 DERMATITIS, UNSPECIFIED: Chronic | Status: ACTIVE | Noted: 2024-06-17

## 2024-07-02 DIAGNOSIS — N80.9 ENDOMETRIOSIS, UNSPECIFIED: ICD-10-CM

## 2024-07-02 DIAGNOSIS — G93.0 CEREBRAL CYSTS: ICD-10-CM

## 2024-07-02 DIAGNOSIS — E03.9 HYPOTHYROIDISM, UNSPECIFIED: ICD-10-CM

## 2024-07-02 DIAGNOSIS — G52.3 DISORDERS OF HYPOGLOSSAL NERVE: ICD-10-CM

## 2024-07-02 DIAGNOSIS — D49.2 NEOPLASM OF UNSPECIFIED BEHAVIOR OF BONE, SOFT TISSUE, AND SKIN: ICD-10-CM

## 2024-07-02 DIAGNOSIS — K14.8 OTHER DISEASES OF TONGUE: ICD-10-CM

## 2024-07-03 ENCOUNTER — APPOINTMENT (OUTPATIENT)
Dept: NEUROSURGERY | Facility: CLINIC | Age: 59
End: 2024-07-03
Payer: COMMERCIAL

## 2024-07-03 PROCEDURE — 99441: CPT

## 2024-07-03 RX ORDER — DIAZEPAM 5 MG/ML
5 AMPUL (ML) INJECTION
Refills: 0 | Status: ACTIVE | COMMUNITY

## 2024-07-08 RX ORDER — METHOCARBAMOL 500 MG/1
500 TABLET, FILM COATED ORAL
Refills: 0 | Status: DISCONTINUED | COMMUNITY
End: 2024-07-08

## 2024-07-09 RX ORDER — METHOCARBAMOL 500 MG/1
500 TABLET, FILM COATED ORAL
Qty: 60 | Refills: 0 | Status: DISCONTINUED | COMMUNITY
Start: 2024-07-03 | End: 2024-07-09

## 2024-07-10 ENCOUNTER — APPOINTMENT (OUTPATIENT)
Dept: NEUROSURGERY | Facility: CLINIC | Age: 59
End: 2024-07-10
Payer: COMMERCIAL

## 2024-07-10 ENCOUNTER — APPOINTMENT (OUTPATIENT)
Dept: MRI IMAGING | Facility: HOSPITAL | Age: 59
End: 2024-07-10

## 2024-07-10 ENCOUNTER — OUTPATIENT (OUTPATIENT)
Dept: OUTPATIENT SERVICES | Facility: HOSPITAL | Age: 59
LOS: 1 days | End: 2024-07-10
Payer: COMMERCIAL

## 2024-07-10 VITALS
TEMPERATURE: 98 F | RESPIRATION RATE: 18 BRPM | WEIGHT: 126 LBS | HEIGHT: 64 IN | DIASTOLIC BLOOD PRESSURE: 83 MMHG | HEART RATE: 63 BPM | SYSTOLIC BLOOD PRESSURE: 147 MMHG | BODY MASS INDEX: 21.51 KG/M2 | OXYGEN SATURATION: 98 %

## 2024-07-10 DIAGNOSIS — Z98.890 OTHER SPECIFIED POSTPROCEDURAL STATES: Chronic | ICD-10-CM

## 2024-07-10 DIAGNOSIS — K14.8 OTHER DISEASES OF TONGUE: ICD-10-CM

## 2024-07-10 DIAGNOSIS — R51.9 HEADACHE, UNSPECIFIED: ICD-10-CM

## 2024-07-10 DIAGNOSIS — M89.9 DISORDER OF BONE, UNSPECIFIED: ICD-10-CM

## 2024-07-10 DIAGNOSIS — M89.8X8 OTHER SPECIFIED DISORDERS OF BONE, OTHER SITE: ICD-10-CM

## 2024-07-10 DIAGNOSIS — K13.79 OTHER LESIONS OF ORAL MUCOSA: ICD-10-CM

## 2024-07-10 PROCEDURE — A9585: CPT

## 2024-07-10 PROCEDURE — 70553 MRI BRAIN STEM W/O & W/DYE: CPT

## 2024-07-10 PROCEDURE — 70553 MRI BRAIN STEM W/O & W/DYE: CPT | Mod: 26

## 2024-07-10 PROCEDURE — 99024 POSTOP FOLLOW-UP VISIT: CPT

## 2024-07-10 RX ORDER — DIAZEPAM 5 MG/1
5 TABLET ORAL
Qty: 5 | Refills: 0 | Status: DISCONTINUED | COMMUNITY
Start: 2024-07-03 | End: 2024-07-10

## 2024-07-22 ENCOUNTER — INPATIENT (INPATIENT)
Facility: HOSPITAL | Age: 59
LOS: 9 days | Discharge: HOME | End: 2024-08-01
Attending: NEUROLOGICAL SURGERY | Admitting: NEUROLOGICAL SURGERY
Payer: COMMERCIAL

## 2024-07-22 ENCOUNTER — APPOINTMENT (OUTPATIENT)
Dept: NEUROSURGERY | Facility: CLINIC | Age: 59
End: 2024-07-22
Payer: COMMERCIAL

## 2024-07-22 ENCOUNTER — TRANSCRIPTION ENCOUNTER (OUTPATIENT)
Age: 59
End: 2024-07-22

## 2024-07-22 VITALS
SYSTOLIC BLOOD PRESSURE: 152 MMHG | TEMPERATURE: 99 F | OXYGEN SATURATION: 99 % | RESPIRATION RATE: 18 BRPM | HEART RATE: 70 BPM | WEIGHT: 126.1 LBS | DIASTOLIC BLOOD PRESSURE: 85 MMHG | HEIGHT: 64 IN

## 2024-07-22 DIAGNOSIS — Z98.890 OTHER SPECIFIED POSTPROCEDURAL STATES: Chronic | ICD-10-CM

## 2024-07-22 DIAGNOSIS — Z09 ENCOUNTER FOR FOLLOW-UP EXAMINATION AFTER COMPLETED TREATMENT FOR CONDITIONS OTHER THAN MALIGNANT NEOPLASM: ICD-10-CM

## 2024-07-22 DIAGNOSIS — Z51.89 ENCOUNTER FOR OTHER SPECIFIED AFTERCARE: ICD-10-CM

## 2024-07-22 LAB
ALBUMIN SERPL ELPH-MCNC: 4.7 G/DL — SIGNIFICANT CHANGE UP (ref 3.3–5)
ALP SERPL-CCNC: 52 U/L — SIGNIFICANT CHANGE UP (ref 40–120)
ALT FLD-CCNC: 17 U/L — SIGNIFICANT CHANGE UP (ref 10–45)
ANION GAP SERPL CALC-SCNC: 8 MMOL/L — SIGNIFICANT CHANGE UP (ref 5–17)
APTT BLD: 30.4 SEC — SIGNIFICANT CHANGE UP (ref 24.5–35.6)
AST SERPL-CCNC: 19 U/L — SIGNIFICANT CHANGE UP (ref 10–40)
BASOPHILS # BLD AUTO: 0.01 K/UL — SIGNIFICANT CHANGE UP (ref 0–0.2)
BASOPHILS NFR BLD AUTO: 0.2 % — SIGNIFICANT CHANGE UP (ref 0–2)
BILIRUB SERPL-MCNC: 0.2 MG/DL — SIGNIFICANT CHANGE UP (ref 0.2–1.2)
BUN SERPL-MCNC: 21 MG/DL — SIGNIFICANT CHANGE UP (ref 7–23)
CALCIUM SERPL-MCNC: 9.5 MG/DL — SIGNIFICANT CHANGE UP (ref 8.4–10.5)
CHLORIDE SERPL-SCNC: 102 MMOL/L — SIGNIFICANT CHANGE UP (ref 96–108)
CO2 SERPL-SCNC: 28 MMOL/L — SIGNIFICANT CHANGE UP (ref 22–31)
CREAT SERPL-MCNC: 0.65 MG/DL — SIGNIFICANT CHANGE UP (ref 0.5–1.3)
CRP SERPL-MCNC: <3 MG/L — SIGNIFICANT CHANGE UP (ref 0–4)
EGFR: 102 ML/MIN/1.73M2 — SIGNIFICANT CHANGE UP
EOSINOPHIL # BLD AUTO: 0.09 K/UL — SIGNIFICANT CHANGE UP (ref 0–0.5)
EOSINOPHIL NFR BLD AUTO: 1.6 % — SIGNIFICANT CHANGE UP (ref 0–6)
ERYTHROCYTE [SEDIMENTATION RATE] IN BLOOD: 7 MM/HR — SIGNIFICANT CHANGE UP
GLUCOSE SERPL-MCNC: 106 MG/DL — HIGH (ref 70–99)
HCT VFR BLD CALC: 38.6 % — SIGNIFICANT CHANGE UP (ref 34.5–45)
HGB BLD-MCNC: 12.8 G/DL — SIGNIFICANT CHANGE UP (ref 11.5–15.5)
IMM GRANULOCYTES NFR BLD AUTO: 0.2 % — SIGNIFICANT CHANGE UP (ref 0–0.9)
INR BLD: 0.92 — SIGNIFICANT CHANGE UP (ref 0.85–1.18)
LACTATE SERPL-SCNC: 0.6 MMOL/L — SIGNIFICANT CHANGE UP (ref 0.5–2)
LYMPHOCYTES # BLD AUTO: 1.63 K/UL — SIGNIFICANT CHANGE UP (ref 1–3.3)
LYMPHOCYTES # BLD AUTO: 28.5 % — SIGNIFICANT CHANGE UP (ref 13–44)
MCHC RBC-ENTMCNC: 32.4 PG — SIGNIFICANT CHANGE UP (ref 27–34)
MCHC RBC-ENTMCNC: 33.2 GM/DL — SIGNIFICANT CHANGE UP (ref 32–36)
MCV RBC AUTO: 97.7 FL — SIGNIFICANT CHANGE UP (ref 80–100)
MONOCYTES # BLD AUTO: 0.43 K/UL — SIGNIFICANT CHANGE UP (ref 0–0.9)
MONOCYTES NFR BLD AUTO: 7.5 % — SIGNIFICANT CHANGE UP (ref 2–14)
NEUTROPHILS # BLD AUTO: 3.55 K/UL — SIGNIFICANT CHANGE UP (ref 1.8–7.4)
NEUTROPHILS NFR BLD AUTO: 62 % — SIGNIFICANT CHANGE UP (ref 43–77)
NRBC # BLD: 0 /100 WBCS — SIGNIFICANT CHANGE UP (ref 0–0)
PLATELET # BLD AUTO: 187 K/UL — SIGNIFICANT CHANGE UP (ref 150–400)
POTASSIUM SERPL-MCNC: 4.4 MMOL/L — SIGNIFICANT CHANGE UP (ref 3.5–5.3)
POTASSIUM SERPL-SCNC: 4.4 MMOL/L — SIGNIFICANT CHANGE UP (ref 3.5–5.3)
PROT SERPL-MCNC: 7.3 G/DL — SIGNIFICANT CHANGE UP (ref 6–8.3)
PROTHROM AB SERPL-ACNC: 10.5 SEC — SIGNIFICANT CHANGE UP (ref 9.5–13)
RBC # BLD: 3.95 M/UL — SIGNIFICANT CHANGE UP (ref 3.8–5.2)
RBC # FLD: 11.9 % — SIGNIFICANT CHANGE UP (ref 10.3–14.5)
SODIUM SERPL-SCNC: 138 MMOL/L — SIGNIFICANT CHANGE UP (ref 135–145)
WBC # BLD: 5.72 K/UL — SIGNIFICANT CHANGE UP (ref 3.8–10.5)
WBC # FLD AUTO: 5.72 K/UL — SIGNIFICANT CHANGE UP (ref 3.8–10.5)

## 2024-07-22 PROCEDURE — 99024 POSTOP FOLLOW-UP VISIT: CPT

## 2024-07-22 PROCEDURE — 99285 EMERGENCY DEPT VISIT HI MDM: CPT

## 2024-07-22 PROCEDURE — 70553 MRI BRAIN STEM W/O & W/DYE: CPT | Mod: 26

## 2024-07-22 PROCEDURE — 93010 ELECTROCARDIOGRAM REPORT: CPT

## 2024-07-22 RX ORDER — ACETAMINOPHEN 500 MG
650 TABLET ORAL EVERY 6 HOURS
Refills: 0 | Status: DISCONTINUED | OUTPATIENT
Start: 2024-07-22 | End: 2024-08-01

## 2024-07-22 RX ORDER — BACTERIOSTATIC SODIUM CHLORIDE 0.9 %
1000 VIAL (ML) INJECTION
Refills: 0 | Status: DISCONTINUED | OUTPATIENT
Start: 2024-07-22 | End: 2024-07-24

## 2024-07-22 RX ORDER — SENNOSIDES 8.6 MG/1
2 TABLET ORAL AT BEDTIME
Refills: 0 | Status: DISCONTINUED | OUTPATIENT
Start: 2024-07-22 | End: 2024-08-01

## 2024-07-22 RX ORDER — CHLORHEXIDINE GLUCONATE 500 MG/1
1 CLOTH TOPICAL
Refills: 0 | Status: COMPLETED | OUTPATIENT
Start: 2024-07-22 | End: 2024-07-26

## 2024-07-22 RX ORDER — LORATADINE 10 MG
17 TABLET,DISINTEGRATING ORAL DAILY
Refills: 0 | Status: DISCONTINUED | OUTPATIENT
Start: 2024-07-22 | End: 2024-08-01

## 2024-07-22 NOTE — H&P ADULT - NSICDXPASTSURGICALHX_GEN_ALL_CORE_FT
PAST SURGICAL HISTORY:  H/O carpal tunnel repair BILAT    H/O myomectomy      product of IVF pregnancy x 1

## 2024-07-22 NOTE — ED PROVIDER NOTE - CLINICAL SUMMARY MEDICAL DECISION MAKING FREE TEXT BOX
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 No other systemic symptoms.   Accompanied by Dr. Mcbride to ED.   Mild HTN, other vitals wnl. Exam as above.   ddx: Concern for possible CSF leak.   Dr. Mcbride requesting MRI, blood cx/ESR/CRP and admission.   Will begin work up as requested by specialist and admit for further care. Clinically no indication for other further emergent ED workup or intervention at this time.

## 2024-07-22 NOTE — ED ADULT NURSE NOTE - NSFALLUNIVINTERV_ED_ALL_ED
Bed/Stretcher in lowest position, wheels locked, appropriate side rails in place/Call bell, personal items and telephone in reach/Instruct patient to call for assistance before getting out of bed/chair/stretcher/Non-slip footwear applied when patient is off stretcher/[place] to call system/Physically safe environment - no spills, clutter or unnecessary equipment/Purposeful proactive rounding/Room/bathroom lighting operational, light cord in reach

## 2024-07-22 NOTE — PATIENT PROFILE ADULT - PRO INTERPRETER NEED 2
deloculated and irrigated with saline    Drainage:  Purulent    Drainage amount: Moderate    Wound treatment:  Wound left open    Packing materials:  None  Post-procedure details:     Procedure completion:  Tolerated well, no immediate complications    FINAL IMPRESSION      1. Left buttock abscess          DISPOSITION/PLAN   DISPOSITION Decision To Discharge 04/20/2023 11:57:03 PM      PATIENT REFERRED TO:  [facility]  [address] [place] [id number] [phone]  Schedule an appointment as soon as possible for a visit in 1 week      [facility] EMERGENCY DEPT  [place]  [phone]          DISCHARGE MEDICATIONS:  Discharge Medication List as of 4/21/2023 12:00 AM        START taking these medications    Details   oxyCODONE-acetaminophen (PERCOCET) 5-325 MG per tablet Take 1 tablet by mouth every 6 hours as needed for Pain for up to 7 days. Intended supply: 7 days.  Take lowest dose possible to manage pain Max Daily Amount: 4 tablets, Disp-28 tablet, R-0Normal      sulfamethoxazole-trimethoprim (BACTRIM DS;SEPTRA DS) 800-160 MG per tablet Take 1 tablet by mouth 2 times daily for 7 days, Disp-14 tablet, R-0Normal                (Please note that portions of this note were completed with a voice recognition program.  Efforts were made to edit thedictations but occasionally words are mis-transcribed.)    Mykel Hurt MD (electronically signed)Emergency Physician        Liza Lzeama MD  04/21/23 [id number] English

## 2024-07-22 NOTE — ED ADULT NURSE NOTE - OBJECTIVE STATEMENT
Pt arrived to the ER for admission s/p drainage in the base of the back of the head s/p cystic lesion removal with posterior craniotomy 06/19 as per pt. Pt deines any neuro complaints/symtpoms and denies any other medical complaints at this moment. Pt is noted to be aox3, able to maintain airway, having nonlabored breathing, no retractions noted, non diaphoretic and able to talk in clear full sentences. IV access placed and labs were sent off.

## 2024-07-22 NOTE — H&P ADULT - ASSESSMENT
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 Mcbride's outpatient office with complaints of wound drainage from surgical site incision that has started on Friday. Patient denies headaches, nausea/vomiting, lightheadedness, dizziness, fevers, and chills. Patient denies pain to the surgical site.      Neuro  - neuro/vitals q4  - MRI head w/ and w/o pending   - possible OR for 7/23 based on clinical course     Cardiology   - normotensive goals     Respiratory   - room air     GI   - Regular diet  - bowel regimen     Endocrine   - check a1c   - hx of hypothyroidism, c/w synthroid 50 mcg daily.     ID  - drainage from surgical incision site.   - ESR/CRP labs pending  - CBC daily   - monitor for fevers     Renal   - IVL +    Heme  - B/L SCDs when in bed.     Dispo: depending on clinical course.      Case discussed with Dr. Mcbride .  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 Mcbride's outpatient office with complaints of wound drainage from surgical site incision that has started on Friday. Patient denies headaches, nausea/vomiting, lightheadedness, dizziness, fevers, and chills. Patient denies pain to the surgical site.      Neuro  - neuro/vitals q4  - MRI head w/ and w/o pending   - possible OR for 7/23 based on clinical course     Cardiology   - normotensive goals     Respiratory   - room air     GI   - Regular diet  - bowel regimen     Endocrine   - check a1c   - hx of hypothyroidism, c/w synthroid 50 mcg daily.     ID  - drainage from surgical incision site.   - ESR/CRP labs pending  - blood cultures pending   - monitor for fevers     Renal   - IVL +    Heme  - B/L SCDs when in bed.     Dispo: depending on clinical course.      Case discussed with Dr. Mcbride .

## 2024-07-22 NOTE — H&P ADULT - NSHPPHYSICALEXAM_GEN_ALL_CORE
General: patient seen laying supine in bed in NAD  Neuro: AAOx3, FC, OE spontaneously, speech clear and fluent, CNII-XI grossly intact, face symmetric, no pronator drift, finger to nose intact, strength 5/5 b/l UE and LE, sensation intact to light touch throughout  HEENT: PERRL, EOMI  Neck: supple  Cardiac: RRR, S1S2  Pulmonary: chest rise symmetric  Abdomen: soft, nontender, nondistended  Ext: perfusing well  Skin: warm, dry  Surgical incision: drainage, redness

## 2024-07-22 NOTE — PATIENT PROFILE ADULT - MONEY FOR FOOD
I reviewed with the resident the medical history and the resident's findings on the physical examination.  I discussed with the resident the patient's diagnosis and concur with the plan.     no

## 2024-07-22 NOTE — H&P ADULT - HISTORY OF PRESENT ILLNESS
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 Mcbride's outpatient office with complaints of wound drainage from surgical site incision that has started on Friday. Patient denies headaches, nausea/vomiting, lightheadedness, dizziness, fevers, and chills. Patient denies pain to the surgical site. Admitted to neurosurgery for wound drainage from surgical site incision.

## 2024-07-22 NOTE — ED ADULT TRIAGE NOTE - CHIEF COMPLAINT QUOTE
Pt. s/p cystic lesion removal with posterior craniotomy 06/19, pt. of MD Mcbride, pt. noticed a few days ago the lesion site began to drain serosanguinous pink-tinged fluid, consistent drianage when lying down. Denies any associated s/s ; denies any neurological s/s, f/c, purulence. Brought into triage by MD Mcbride, neurosgy team in triage, plan for admission

## 2024-07-22 NOTE — ED PROVIDER NOTE - PHYSICAL EXAMINATION
L incision site healing well, no large dehiscence. Minimal localized erythema.   neurosurg at bedside.     Physical Exam:    CONSTITUTIONAL:  Generally well appearing, no acute distress, alert, awake and oriented  HEENT:  Moist mucous membranes, normal voice  PULM:  No accessory muscle use, speaking full sentences  SKIN:  Normal in appearance, normal color

## 2024-07-22 NOTE — H&P ADULT - NSICDXPASTMEDICALHX_GEN_ALL_CORE_FT
PAST MEDICAL HISTORY:  Atypical facial pain     Eczema     Endometriosis     H/O bone cyst IN HER SKULL    H/O headache     Hypothyroidism     MVP (mitral valve prolapse)     Osteoarthritis

## 2024-07-22 NOTE — ED PROVIDER NOTE - OBJECTIVE STATEMENT
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 No other systemic symptoms.   Accompanied by Dr. Mcbride to ED.

## 2024-07-23 ENCOUNTER — RESULT REVIEW (OUTPATIENT)
Age: 59
End: 2024-07-23

## 2024-07-23 DIAGNOSIS — I34.1 NONRHEUMATIC MITRAL (VALVE) PROLAPSE: ICD-10-CM

## 2024-07-23 DIAGNOSIS — T14.8XXA OTHER INJURY OF UNSPECIFIED BODY REGION, INITIAL ENCOUNTER: ICD-10-CM

## 2024-07-23 DIAGNOSIS — F41.9 ANXIETY DISORDER, UNSPECIFIED: ICD-10-CM

## 2024-07-23 DIAGNOSIS — Z01.818 ENCOUNTER FOR OTHER PREPROCEDURAL EXAMINATION: ICD-10-CM

## 2024-07-23 DIAGNOSIS — E03.9 HYPOTHYROIDISM, UNSPECIFIED: ICD-10-CM

## 2024-07-23 LAB
ANION GAP SERPL CALC-SCNC: 7 MMOL/L — SIGNIFICANT CHANGE UP (ref 5–17)
BLD GP AB SCN SERPL QL: NEGATIVE — SIGNIFICANT CHANGE UP
BUN SERPL-MCNC: 18 MG/DL — SIGNIFICANT CHANGE UP (ref 7–23)
CALCIUM SERPL-MCNC: 9.2 MG/DL — SIGNIFICANT CHANGE UP (ref 8.4–10.5)
CHLORIDE SERPL-SCNC: 105 MMOL/L — SIGNIFICANT CHANGE UP (ref 96–108)
CO2 SERPL-SCNC: 29 MMOL/L — SIGNIFICANT CHANGE UP (ref 22–31)
CREAT SERPL-MCNC: 0.74 MG/DL — SIGNIFICANT CHANGE UP (ref 0.5–1.3)
EGFR: 94 ML/MIN/1.73M2 — SIGNIFICANT CHANGE UP
GLUCOSE SERPL-MCNC: 99 MG/DL — SIGNIFICANT CHANGE UP (ref 70–99)
HCG UR QL: NEGATIVE — SIGNIFICANT CHANGE UP
HCT VFR BLD CALC: 36.2 % — SIGNIFICANT CHANGE UP (ref 34.5–45)
HGB BLD-MCNC: 11.9 G/DL — SIGNIFICANT CHANGE UP (ref 11.5–15.5)
MAGNESIUM SERPL-MCNC: 2.2 MG/DL — SIGNIFICANT CHANGE UP (ref 1.6–2.6)
MCHC RBC-ENTMCNC: 32.2 PG — SIGNIFICANT CHANGE UP (ref 27–34)
MCHC RBC-ENTMCNC: 32.9 GM/DL — SIGNIFICANT CHANGE UP (ref 32–36)
MCV RBC AUTO: 98.1 FL — SIGNIFICANT CHANGE UP (ref 80–100)
NRBC # BLD: 0 /100 WBCS — SIGNIFICANT CHANGE UP (ref 0–0)
PHOSPHATE SERPL-MCNC: 3.9 MG/DL — SIGNIFICANT CHANGE UP (ref 2.5–4.5)
PLATELET # BLD AUTO: 177 K/UL — SIGNIFICANT CHANGE UP (ref 150–400)
POTASSIUM SERPL-MCNC: 4.9 MMOL/L — SIGNIFICANT CHANGE UP (ref 3.5–5.3)
POTASSIUM SERPL-SCNC: 4.9 MMOL/L — SIGNIFICANT CHANGE UP (ref 3.5–5.3)
RBC # BLD: 3.69 M/UL — LOW (ref 3.8–5.2)
RBC # FLD: 12.2 % — SIGNIFICANT CHANGE UP (ref 10.3–14.5)
RH IG SCN BLD-IMP: NEGATIVE — SIGNIFICANT CHANGE UP
SODIUM SERPL-SCNC: 141 MMOL/L — SIGNIFICANT CHANGE UP (ref 135–145)
WBC # BLD: 4.29 K/UL — SIGNIFICANT CHANGE UP (ref 3.8–10.5)
WBC # FLD AUTO: 4.29 K/UL — SIGNIFICANT CHANGE UP (ref 3.8–10.5)

## 2024-07-23 PROCEDURE — 88305 TISSUE EXAM BY PATHOLOGIST: CPT | Mod: 26

## 2024-07-23 PROCEDURE — 99232 SBSQ HOSP IP/OBS MODERATE 35: CPT

## 2024-07-23 PROCEDURE — 99223 1ST HOSP IP/OBS HIGH 75: CPT

## 2024-07-23 PROCEDURE — 62142 RMVL B1 FLP/PROSTC PLATE SKL: CPT | Mod: 58

## 2024-07-23 PROCEDURE — 15733 MUSC MYOQ/FSCQ FLP H&N PEDCL: CPT | Mod: 58

## 2024-07-23 RX ORDER — ESCITALOPRAM OXALATE 20 MG
10 TABLET ORAL DAILY
Refills: 0 | Status: DISCONTINUED | OUTPATIENT
Start: 2024-07-23 | End: 2024-08-01

## 2024-07-23 RX ORDER — CEFAZOLIN SODIUM 10 G
2000 VIAL (EA) INJECTION EVERY 8 HOURS
Refills: 0 | Status: DISCONTINUED | OUTPATIENT
Start: 2024-07-24 | End: 2024-07-26

## 2024-07-23 RX ORDER — LORATADINE 10 MG
10 TABLET ORAL DAILY
Refills: 0 | Status: DISCONTINUED | OUTPATIENT
Start: 2024-07-23 | End: 2024-08-01

## 2024-07-23 RX ORDER — SCOPOLAMINE 1 MG/3D
1 SYSTEM TRANSDERMAL
Refills: 0 | Status: DISCONTINUED | OUTPATIENT
Start: 2024-07-23 | End: 2024-08-01

## 2024-07-23 RX ORDER — POVIDONE-IODINE 0.1 G/ML
1 SOLUTION TOPICAL ONCE
Refills: 0 | Status: COMPLETED | OUTPATIENT
Start: 2024-07-23 | End: 2024-07-23

## 2024-07-23 RX ORDER — ACETAMINOPHEN 500 MG
1000 TABLET ORAL ONCE
Refills: 0 | Status: COMPLETED | OUTPATIENT
Start: 2024-07-23 | End: 2024-07-23

## 2024-07-23 RX ORDER — LEVOTHYROXINE SODIUM 175 MCG
50 TABLET ORAL DAILY
Refills: 0 | Status: DISCONTINUED | OUTPATIENT
Start: 2024-07-23 | End: 2024-08-01

## 2024-07-23 RX ORDER — OXYCODONE HYDROCHLORIDE 30 MG/1
5 TABLET ORAL EVERY 6 HOURS
Refills: 0 | Status: DISCONTINUED | OUTPATIENT
Start: 2024-07-23 | End: 2024-07-24

## 2024-07-23 RX ORDER — APREPITANT 40 MG
40 CAPSULE ORAL ONCE
Refills: 0 | Status: COMPLETED | OUTPATIENT
Start: 2024-07-23 | End: 2024-07-23

## 2024-07-23 RX ADMIN — Medication 55 MILLILITER(S): at 00:01

## 2024-07-23 RX ADMIN — Medication 10 MILLIGRAM(S): at 05:46

## 2024-07-23 RX ADMIN — Medication 50 MICROGRAM(S): at 04:57

## 2024-07-23 RX ADMIN — OXYCODONE HYDROCHLORIDE 5 MILLIGRAM(S): 30 TABLET ORAL at 23:20

## 2024-07-23 RX ADMIN — CHLORHEXIDINE GLUCONATE 1 APPLICATION(S): 500 CLOTH TOPICAL at 05:47

## 2024-07-23 RX ADMIN — Medication 1000 MILLIGRAM(S): at 16:12

## 2024-07-23 RX ADMIN — Medication 10 MILLIGRAM(S): at 05:45

## 2024-07-23 RX ADMIN — Medication 650 MILLIGRAM(S): at 21:33

## 2024-07-23 RX ADMIN — Medication 650 MILLIGRAM(S): at 22:33

## 2024-07-23 RX ADMIN — Medication 40 MILLIGRAM(S): at 16:12

## 2024-07-23 RX ADMIN — POVIDONE-IODINE 1 APPLICATION(S): 0.1 SOLUTION TOPICAL at 16:17

## 2024-07-23 NOTE — PROGRESS NOTE ADULT - SUBJECTIVE AND OBJECTIVE BOX
NEUROSURGERY POST OP NOTE:     POD# 0 S/P excisional debridement of wound of scalp including bone     S: Pt seen and examined at bedside post-procedure. Endorse 6/10 incisional pain relieved with pain meds. Denies nausea, vomiting, weakness      T(C): 36.7 (07-23-24 @ 21:30), Max: 36.7 (07-23-24 @ 21:30)  HR: 64 (07-23-24 @ 21:30) (56 - 65)  BP: 152/80 (07-23-24 @ 21:30) (114/74 - 152/80)  RR: 18 (07-23-24 @ 21:30) (13 - 21)  SpO2: 95% (07-23-24 @ 21:30) (95% - 100%)      07-23-24 @ 07:01  -  07-23-24 @ 22:03  --------------------------------------------------------  IN: 110 mL / OUT: 20 mL / NET: 90 mL        acetaminophen     Tablet .. 650 milliGRAM(s) Oral every 6 hours PRN  chlorhexidine 2% Cloths 1 Application(s) Topical <User Schedule>  escitalopram 10 milliGRAM(s) Oral daily  levothyroxine 50 MICROGram(s) Oral daily  loratadine 10 milliGRAM(s) Oral daily  oxyCODONE    IR 5 milliGRAM(s) Oral every 6 hours PRN  polyethylene glycol 3350 17 Gram(s) Oral daily  scopolamine 1 mG/72 Hr(s) Patch 1 Patch Transdermal every 72 hours  senna 2 Tablet(s) Oral at bedtime  sodium chloride 0.9%. 1000 milliLiter(s) IV Continuous <Continuous>      RADIOLOGY:     Exam:  General: patient seen laying supine in bed in NAD  Neuro: AAOx3, FC, OE spontaneously, speech clear and fluent, CNII-XI grossly intact, face symmetric, no pronator drift, finger to nose intact, strength 5/5 b/l UE and LE, sensation intact to light touch throughout  HEENT: PERRL, EOMI  Neck: supple  Cardiac: RRR, S1S2  Pulmonary: chest rise symmetric  Abdomen: soft, nontender, nondistended  Ext: perfusing well  Skin: warm, dry  Wound: + headwrap in place c/d/i, SGJP x1       DEVICES:       Assessment:  58 y/o female PMH hypothyroidism, depression, s/p left craniotomy, exploration of foramen magnum, 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 Mcbride's outpatient office with complaints of wound drainage from surgical site incision that has started on Friday. Patient denies headaches, nausea/vomiting, lightheadedness, dizziness, fevers, and chills. Patient denies pain to the surgical site. Admitted to to [facility] for further management. Now s/p excisional debridement of wound of scalp including bone (7/23).    Neuro  - neuro/vitals q4  - SGJP x1, monitor output  - MRI head w/ and w/o done  - Anxiety/depression: lexapro 10mg qd    Cardiology   - SBP <160    Respiratory   - RA, IS    GI   - ADAT  - bowel regimen     Renal   - IVF while advancing diet    Endocrine   - a1c 5.4   - hx of hypothyroidism, c/w synthroid 50 mcg daily.     ID  - afebrile  - post-op ancef  - f/u OR cx from 7/23  - ESR/CRP negative  - blood cultures NGTD     Heme  - dvt ppx: SCDs    Dispo: regional, full code, OR pending    Case discussed with Dr. Mcbride .          Assessment:  Present when checked    []  GCS  E   V  M     Heart Failure: []Acute, [] acute on chronic , []chronic  Heart Failure:  [] Diastolic (HFpEF), [] Systolic (HFrEF), []Combined (HFpEF and HFrEF), [] RHF, [] Pulm HTN, [] Other    [] JOHN, [] ATN, [] AIN, [] other  [] CKD1, [] CKD2, [] CKD 3, [] CKD 4, [] CKD 5, []ESRD    Encephalopathy: [] Metabolic, [] Hepatic, [] toxic, [] Neurological, [] Other    Abnormal Nurtitional Status: [] malnurtition (see nutrition note), [ ]underweight: BMI < 19, [] morbid obesity: BMI >40, [] Cachexia    [] Sepsis  [] hypovolemic shock,[] cardiogenic shock, [] hemorrhagic shock, [] neuogenic shock  [] Acute Respiratory Failure  []Cerebral edema, [] Brain compression/ herniation,   [] Functional quadriplegia  [] Acute blood loss anemia

## 2024-07-23 NOTE — PRE-ANESTHESIA EVALUATION ADULT - NSANTHADDINFOFT_GEN_ALL_CORE
Risks, benefits and alternatives including but not limited to nausea, bleeding, and local trauma/positioning related injury discussed. All questions answered. The patient agrees to proceed.  Risks and Benefits of arterial access discussed and accepted.

## 2024-07-23 NOTE — PRE-ANESTHESIA EVALUATION ADULT - NSANTHPMHFT_GEN_ALL_CORE
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 Mcbride's outpatient office with complaints of wound drainage    HTN  LEFT arm pain and numbness s/p arterial line

## 2024-07-23 NOTE — PROGRESS NOTE ADULT - SUBJECTIVE AND OBJECTIVE BOX
Surgery:  washout of suboccipital wound, explantation of hardware and complex closure   Surgeon:  Dr. Camp  Consent: Signed by patient vs HCP                   NAME/NUMBER of HCP:  Consent in chart- signed by patient     Decadron (Flushing)  gabapentin (Rash)  latex (Flushing)  tetracycline (Flushing)  cefuroxime (Flushing)  Zithromax (Flushing)      SUBJECTIVE:    T(C): 36.6 (07-23-24 @ 09:00), Max: 37.2 (07-22-24 @ 18:05)  HR: 59 (07-23-24 @ 09:00) (56 - 70)  BP: 117/73 (07-23-24 @ 09:00) (114/74 - 152/85)  RR: 18 (07-23-24 @ 09:00) (16 - 18)  SpO2: 99% (07-23-24 @ 09:00) (98% - 100%)  Wt(kg): --    EXAM:  Pulses:    07-23    141  |  105  |  18  ----------------------------<  99  4.9   |  29  |  0.74    Ca    9.2      23 Jul 2024 07:06  Phos  3.9     07-23  Mg     2.2     07-23    TPro  7.3  /  Alb  4.7  /  TBili  0.2  /  DBili  x   /  AST  19  /  ALT  17  /  AlkPhos  52  07-22    CBC Full  -  ( 23 Jul 2024 07:06 )  WBC Count : 4.29 K/uL  RBC Count : 3.69 M/uL  Hemoglobin : 11.9 g/dL  Hematocrit : 36.2 %  Platelet Count - Automated : 177 K/uL  Mean Cell Volume : 98.1 fl  Mean Cell Hemoglobin : 32.2 pg  Mean Cell Hemoglobin Concentration : 32.9 gm/dL  Auto Neutrophil # : x  Auto Lymphocyte # : x  Auto Monocyte # : x  Auto Eosinophil # : x  Auto Basophil # : x  Auto Neutrophil % : x  Auto Lymphocyte % : x  Auto Monocyte % : x  Auto Eosinophil % : x  Auto Basophil % : x    PT/INR - ( 22 Jul 2024 18:13 )   PT: 10.5 sec;   INR: 0.92          PTT - ( 22 Jul 2024 18:13 )  PTT:30.4 sec    Pregnancy test? (serum hcg for any female < 57 y/o) (within 48hrs): [ ] Negative Result  [ ] Positive Result  [ x ] N/A : male or female > 57 y/o    COVID swab? (within 48hrs): _ Y  x_ N    Have there been 2 T&S during this admission?  x_ Y  _ N  Is there an active T&S within 72hrs?  x_ Y  _ N    Heparin drip? _ Y  x_ N           If yes --> Needs to be held 2 hours prior to angiogram           Order placed?  _ Y  _ N (primary team is aware)    Contrast allergy? _ Y  x_ N           If yes --> premedication ordered _ Y  _ N    Implanted Devices (pacemaker, drug pump...etc):   _ Y  x_ N           If YES --> EPS consulted to interrogate device: _ Y  _ N           If YES --> EPS called to let them know patient going for surgery:                             [ ] device needs to be turned off                               [ ] magnet needs to be placed for surgery                              [ ] nothing to do per EP, may proceed with cautery use in OR                                       CXR:  EKG: ordered   ECHO:  Medical Clearances: Dr. Campos   Other Clearances:                                                                       Assessment/Plan:    - Angiogram tomorrow for washout of suboccipital wound, explantation of hardware and complex closure   - NPO (except meds)  - IVF   - PRBCs on hold   - Consent in chart   - Medically optimized  - D/w Dr. Mcbride and Dr. Camp    Assessment:  Present when checked    []  GCS  E   V  M     Heart Failure: []Acute, [] acute on chronic , []chronic  Heart Failure:  [] Diastolic (HFpEF), [] Systolic (HFrEF), []Combined (HFpEF and HFrEF), [] RHF, [] Pulm HTN, [] Other    [] JOHN, [] ATN, [] AIN, [] other  [] CKD1, [] CKD2, [] CKD 3, [] CKD 4, [] CKD 5, []ESRD    Encephalopathy: [] Metabolic, [] Hepatic, [] toxic, [] Neurological, [] Other    Abnormal Nurtitional Status: [] malnurtition (see nutrition note), [ ]underweight: BMI < 19, [] morbid obesity: BMI >40, [] Cachexia    [] Sepsis  [] hypovolemic shock,[] cardiogenic shock, [] hemorrhagic shock, [] neuogenic shock  [] Acute Respiratory Failure  []Cerebral edema, [] Brain compression/ herniation,   [] Functional quadriplegia  [] Acute blood loss anemia

## 2024-07-23 NOTE — PRE-ANESTHESIA EVALUATION ADULT - NSANTHPEFT_GEN_ALL_CORE
GEN: A&Ox3, NAD  HEENT: no abnormal facies, neck unremarkable  CV: RRR, no M/R/G  PULM: CTABL, breathing comfortably on RA  NEURO: moves all 4 extremities, no gross deficit.

## 2024-07-23 NOTE — PROGRESS NOTE ADULT - ASSESSMENT
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 Mcbride's outpatient office with complaints of wound drainage from surgical site incision that has started on Friday. Patient denies headaches, nausea/vomiting, lightheadedness, dizziness, fevers, and chills. Patient denies pain to the surgical site. Admitted to to [facility] for further management.

## 2024-07-23 NOTE — BRIEF OPERATIVE NOTE - NSICDXBRIEFPROCEDURE_GEN_ALL_CORE_FT
PROCEDURES:  Excisional debridement of wound of scalp including bone 23-Jul-2024 18:45:57  Westley Camarillo

## 2024-07-23 NOTE — PROGRESS NOTE ADULT - SUBJECTIVE AND OBJECTIVE BOX
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 Mcbride's outpatient office with complaints of wound drainage from surgical site incision that has started on Friday. Patient denies headaches, nausea/vomiting, lightheadedness, dizziness, fevers, and chills. Patient denies pain to the surgical site. Admitted to neurosurgery for wound drainage from surgical site incision.  (22 Jul 2024 18:27)    INTERVAL EVENTS: [facility]    Hospital course:   7/22: admit to neurosurgery for wound drainge from surgical site. MRI head w/ and w/o orderd. Infectious workup ordered (ESR/CRP/Blood cultures)   7/23: [facility]    Vital Signs Last 24 Hrs  T(C): 36.3 (22 Jul 2024 21:51), Max: 37.2 (22 Jul 2024 18:05)  T(F): 97.4 (22 Jul 2024 21:51), Max: 98.9 (22 Jul 2024 18:05)  HR: 66 (22 Jul 2024 21:51) (66 - 70)  BP: 125/75 (22 Jul 2024 21:51) (125/75 - 152/85)  BP(mean): --  RR: 16 (22 Jul 2024 21:51) (16 - 18)  SpO2: 99% (22 Jul 2024 21:51) (98% - 99%)    Parameters below as of 22 Jul 2024 21:51  Patient On (Oxygen Delivery Method): room air        I&O's Summary      PHYSICAL EXAM:  General: patient seen laying supine in bed in NAD  Neuro: AAOx3, FC, OE spontaneously, speech clear and fluent, CNII-XI grossly intact, face symmetric, no pronator drift, finger to nose intact, strength 5/5 b/l UE and LE, sensation intact to light touch throughout  HEENT: PERRL, EOMI  Neck: supple  Cardiac: RRR, S1S2  Pulmonary: chest rise symmetric  Abdomen: soft, nontender, nondistended  Ext: perfusing well  Skin: warm, dry  L retrosigmoid crani incision: +small area of drainage and redness to middle portion of incision    LABS:                        12.8   5.72  )-----------( 187      ( 22 Jul 2024 18:13 )             38.6     07-22    138  |  102  |  21  ----------------------------<  106<H>  4.4   |  28  |  0.65    Ca    9.5      22 Jul 2024 18:13    TPro  7.3  /  Alb  4.7  /  TBili  0.2  /  DBili  x   /  AST  19  /  ALT  17  /  AlkPhos  52  07-22    PT/INR - ( 22 Jul 2024 18:13 )   PT: 10.5 sec;   INR: 0.92          PTT - ( 22 Jul 2024 18:13 )  PTT:30.4 sec  Urinalysis Basic - ( 22 Jul 2024 18:13 )    Color: x / Appearance: x / SG: x / pH: x  Gluc: 106 mg/dL / Ketone: x  / Bili: x / Urobili: x   Blood: x / Protein: x / Nitrite: x   Leuk Esterase: x / RBC: x / WBC x   Sq Epi: x / Non Sq Epi: x / Bacteria: x          CAPILLARY BLOOD GLUCOSE          Drug Levels: [] N/A    CSF Analysis: [] N/A      Allergies    Decadron (Flushing)  gabapentin (Rash)  latex (Flushing)  tetracycline (Flushing)  cefuroxime (Flushing)  Zithromax (Flushing)    Intolerances        Home Medications:  acetaminophen 500 mg oral tablet: 2 tab(s) orally every 8 hours (21 Jun 2024 12:16)  escitalopram 10 mg oral tablet: 1 tab(s) orally once a day (19 Jun 2024 06:37)  estradiol-norethindrone 0.5 mg-0.1 mg oral tablet: 1 tab(s) orally once a day (19 Jun 2024 06:37)  levothyroxine 50 mcg (0.05 mg) oral tablet: 1 tab(s) orally once a day (19 Jun 2024 06:37)  senna leaf extract oral tablet: 2 tab(s) orally once a day (at bedtime) (21 Jun 2024 12:16)      MEDICATIONS:  MEDICATIONS  (STANDING):  chlorhexidine 2% Cloths 1 Application(s) Topical <User Schedule>  polyethylene glycol 3350 17 Gram(s) Oral daily  senna 2 Tablet(s) Oral at bedtime  sodium chloride 0.9%. 1000 milliLiter(s) (55 mL/Hr) IV Continuous <Continuous>    MEDICATIONS  (PRN):  acetaminophen     Tablet .. 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 Mcbride's outpatient office with complaints of wound drainage from surgical site incision that has started on Friday. Patient denies headaches, nausea/vomiting, lightheadedness, dizziness, fevers, and chills. Patient denies pain to the surgical site. Admitted to to [facility] for further management.    Neuro  - neuro/vitals q4  - MRI head w/ and w/o done f/u final read  - possible OR for 7/23 based on clinical course     Cardiology   - SBP <160    Respiratory   - RA, IS    GI   - NPO at midnight  - bowel regimen     Renal   - IVF while NPO    Endocrine   - check a1c   - hx of hypothyroidism, c/w synthroid 50 mcg daily.     ID  - afebrile  - ESR/CRP negative  - blood cultures pending     Heme  - B/L SCDs when in bed.     Dispo: regional, full code, possible OR    Case discussed with Dr. Mcbride .      Assessment: present when checked     [] GCS   E   V   M     Heart Failure: [] Acute, [] acute on chronic, [] chronic   Heart Failure: [] Diastolic (HFpEF), [] Systolic (HRrEF), [] Combined (HFpEF and HFrEF), [] RHF, [] Pulm HTN, [] Other     [] JOHN, [] ATN, [] AIN, [] other   [] CKD1, [] CKD2, [] CKD3, [] CKD4, [] CKD5, [] ESRD     Encephalopathy: [] Metabolic, [] Hepatic, [] Toxic, [] Neurological, [] Other     Abnormal Nutritional Status: [] malnutrition (see nutrition note), []underweight: BMI <19, [] morbid obesity: BMI >40, [] Cachexia     [] Sepsis   [] Hypovolemic shock, [] Cardiogenic shock, [] Hemorrhagic shock, [] Neurogenic shock   [] Acute respiratory failure   [] Cerebral edema, [] Brain compression / herniation   [] Functional quadriplegia   [] Acute blood loss anemia

## 2024-07-23 NOTE — PROGRESS NOTE ADULT - SUBJECTIVE AND OBJECTIVE BOX
O/N Events:  Subjective/ROS: Denies HA, CP, SOB, n/v, changes in bowel/urinary habits.  12pt ROS otherwise negative.    VITALS  Vital Signs Last 24 Hrs  T(C): 36.6 (23 Jul 2024 09:00), Max: 37.2 (22 Jul 2024 18:05)  T(F): 97.8 (23 Jul 2024 09:00), Max: 98.9 (22 Jul 2024 18:05)  HR: 59 (23 Jul 2024 09:00) (56 - 70)  BP: 117/73 (23 Jul 2024 09:00) (114/74 - 152/85)  BP(mean): --  RR: 18 (23 Jul 2024 09:00) (16 - 18)  SpO2: 99% (23 Jul 2024 09:00) (98% - 100%)    Parameters below as of 23 Jul 2024 09:00  Patient On (Oxygen Delivery Method): room air        I&O's Summary      CAPILLARY BLOOD GLUCOSE          PHYSICAL EXAM  General: A&Ox3; NAD  Head: NC/AT; PERRL; EOMI; anicteric sclera  Neck: Supple; no JVD  Respiratory: CTA B/L; no wheezes/crackles/rales auscultated w/ good air movement  Cardiovascular: Regular rhythm/rate; S1/S2; Holosystolic murmur with click over mitral area   Gastrointestinal: Soft; NTND w/out rebound tenderness or guarding; bowel sounds normal  Extremities: WWP; no edema or cyanosis; radial/pedal pulses palpable  Neurological:  CNII-XII grossly intact; no obvious focal deficits  Skin: No rashes noted; incision with drainage and swelling near middle of incision   Vasc: +2 DP/PT pulses b/l   Psych: Appropriate Affect    MEDICATIONS  (STANDING):  acetaminophen     Tablet .. 1000 milliGRAM(s) Oral once  aprepitant 40 milliGRAM(s) Oral once  chlorhexidine 2% Cloths 1 Application(s) Topical <User Schedule>  escitalopram 10 milliGRAM(s) Oral daily  levothyroxine 50 MICROGram(s) Oral daily  loratadine 10 milliGRAM(s) Oral daily  polyethylene glycol 3350 17 Gram(s) Oral daily  povidone iodine 5% Nasal Swab 1 Application(s) Both Nostrils once  senna 2 Tablet(s) Oral at bedtime  sodium chloride 0.9%. 1000 milliLiter(s) (55 mL/Hr) IV Continuous <Continuous>    MEDICATIONS  (PRN):  acetaminophen     Tablet .. 650 milliGRAM(s) Oral every 6 hours PRN Temp greater or equal to 38C (100.4F), Mild Pain (1 - 3)      Decadron (Flushing)  gabapentin (Rash)  latex (Flushing)  tetracycline (Flushing)  cefuroxime (Flushing)  Zithromax (Flushing)      LABS                        11.9   4.29  )-----------( 177      ( 23 Jul 2024 07:06 )             36.2     07-23    141  |  105  |  18  ----------------------------<  99  4.9   |  29  |  0.74    Ca    9.2      23 Jul 2024 07:06  Phos  3.9     07-23  Mg     2.2     07-23    TPro  7.3  /  Alb  4.7  /  TBili  0.2  /  DBili  x   /  AST  19  /  ALT  17  /  AlkPhos  52  07-22    PT/INR - ( 22 Jul 2024 18:13 )   PT: 10.5 sec;   INR: 0.92          PTT - ( 22 Jul 2024 18:13 )  PTT:30.4 sec  Urinalysis Basic - ( 23 Jul 2024 07:06 )    Color: x / Appearance: x / SG: x / pH: x  Gluc: 99 mg/dL / Ketone: x  / Bili: x / Urobili: x   Blood: x / Protein: x / Nitrite: x   Leuk Esterase: x / RBC: x / WBC x   Sq Epi: x / Non Sq Epi: x / Bacteria: x            IMAGING/EKG/ETC: reviewed

## 2024-07-24 DIAGNOSIS — T81.30XA DISRUPTION OF WOUND, UNSPECIFIED, INITIAL ENCOUNTER: ICD-10-CM

## 2024-07-24 LAB
ANION GAP SERPL CALC-SCNC: 7 MMOL/L — SIGNIFICANT CHANGE UP (ref 5–17)
ANION GAP SERPL CALC-SCNC: 8 MMOL/L — SIGNIFICANT CHANGE UP (ref 5–17)
BUN SERPL-MCNC: 14 MG/DL — SIGNIFICANT CHANGE UP (ref 7–23)
BUN SERPL-MCNC: 9 MG/DL — SIGNIFICANT CHANGE UP (ref 7–23)
CALCIUM SERPL-MCNC: 8.5 MG/DL — SIGNIFICANT CHANGE UP (ref 8.4–10.5)
CALCIUM SERPL-MCNC: 9.1 MG/DL — SIGNIFICANT CHANGE UP (ref 8.4–10.5)
CHLORIDE SERPL-SCNC: 100 MMOL/L — SIGNIFICANT CHANGE UP (ref 96–108)
CHLORIDE SERPL-SCNC: 103 MMOL/L — SIGNIFICANT CHANGE UP (ref 96–108)
CO2 SERPL-SCNC: 25 MMOL/L — SIGNIFICANT CHANGE UP (ref 22–31)
CO2 SERPL-SCNC: 27 MMOL/L — SIGNIFICANT CHANGE UP (ref 22–31)
CREAT SERPL-MCNC: 0.54 MG/DL — SIGNIFICANT CHANGE UP (ref 0.5–1.3)
CREAT SERPL-MCNC: 0.75 MG/DL — SIGNIFICANT CHANGE UP (ref 0.5–1.3)
EGFR: 107 ML/MIN/1.73M2 — SIGNIFICANT CHANGE UP
EGFR: 92 ML/MIN/1.73M2 — SIGNIFICANT CHANGE UP
GLUCOSE SERPL-MCNC: 110 MG/DL — HIGH (ref 70–99)
GLUCOSE SERPL-MCNC: 113 MG/DL — HIGH (ref 70–99)
HCT VFR BLD CALC: 36.2 % — SIGNIFICANT CHANGE UP (ref 34.5–45)
HGB BLD-MCNC: 11.7 G/DL — SIGNIFICANT CHANGE UP (ref 11.5–15.5)
MAGNESIUM SERPL-MCNC: 1.9 MG/DL — SIGNIFICANT CHANGE UP (ref 1.6–2.6)
MCHC RBC-ENTMCNC: 31.7 PG — SIGNIFICANT CHANGE UP (ref 27–34)
MCHC RBC-ENTMCNC: 32.3 GM/DL — SIGNIFICANT CHANGE UP (ref 32–36)
MCV RBC AUTO: 98.1 FL — SIGNIFICANT CHANGE UP (ref 80–100)
NRBC # BLD: 0 /100 WBCS — SIGNIFICANT CHANGE UP (ref 0–0)
PHOSPHATE SERPL-MCNC: 3.4 MG/DL — SIGNIFICANT CHANGE UP (ref 2.5–4.5)
PLATELET # BLD AUTO: 175 K/UL — SIGNIFICANT CHANGE UP (ref 150–400)
POTASSIUM SERPL-MCNC: 4.3 MMOL/L — SIGNIFICANT CHANGE UP (ref 3.5–5.3)
POTASSIUM SERPL-MCNC: 4.4 MMOL/L — SIGNIFICANT CHANGE UP (ref 3.5–5.3)
POTASSIUM SERPL-SCNC: 4.3 MMOL/L — SIGNIFICANT CHANGE UP (ref 3.5–5.3)
POTASSIUM SERPL-SCNC: 4.4 MMOL/L — SIGNIFICANT CHANGE UP (ref 3.5–5.3)
RBC # BLD: 3.69 M/UL — LOW (ref 3.8–5.2)
RBC # FLD: 11.9 % — SIGNIFICANT CHANGE UP (ref 10.3–14.5)
SODIUM SERPL-SCNC: 132 MMOL/L — LOW (ref 135–145)
SODIUM SERPL-SCNC: 138 MMOL/L — SIGNIFICANT CHANGE UP (ref 135–145)
WBC # BLD: 7.79 K/UL — SIGNIFICANT CHANGE UP (ref 3.8–10.5)
WBC # FLD AUTO: 7.79 K/UL — SIGNIFICANT CHANGE UP (ref 3.8–10.5)

## 2024-07-24 PROCEDURE — 99233 SBSQ HOSP IP/OBS HIGH 50: CPT

## 2024-07-24 RX ORDER — OXYCODONE HYDROCHLORIDE 30 MG/1
5 TABLET ORAL EVERY 4 HOURS
Refills: 0 | Status: DISCONTINUED | OUTPATIENT
Start: 2024-07-24 | End: 2024-07-29

## 2024-07-24 RX ADMIN — Medication 100 MILLIGRAM(S): at 18:02

## 2024-07-24 RX ADMIN — Medication 650 MILLIGRAM(S): at 11:37

## 2024-07-24 RX ADMIN — OXYCODONE HYDROCHLORIDE 5 MILLIGRAM(S): 30 TABLET ORAL at 00:20

## 2024-07-24 RX ADMIN — Medication 650 MILLIGRAM(S): at 19:01

## 2024-07-24 RX ADMIN — OXYCODONE HYDROCHLORIDE 5 MILLIGRAM(S): 30 TABLET ORAL at 04:39

## 2024-07-24 RX ADMIN — Medication 100 MILLIGRAM(S): at 10:03

## 2024-07-24 RX ADMIN — Medication 10 MILLIGRAM(S): at 11:54

## 2024-07-24 RX ADMIN — Medication 50 MICROGRAM(S): at 05:26

## 2024-07-24 RX ADMIN — Medication 10 MILLIGRAM(S): at 11:53

## 2024-07-24 RX ADMIN — OXYCODONE HYDROCHLORIDE 5 MILLIGRAM(S): 30 TABLET ORAL at 05:39

## 2024-07-24 RX ADMIN — Medication 100 MILLIGRAM(S): at 01:20

## 2024-07-24 RX ADMIN — Medication 650 MILLIGRAM(S): at 18:05

## 2024-07-24 RX ADMIN — Medication 650 MILLIGRAM(S): at 12:37

## 2024-07-24 NOTE — PROGRESS NOTE ADULT - SUBJECTIVE AND OBJECTIVE BOX
O/N Events: [facility]ZIA  Subjective/ROS: No acute complaints. Denies HA, CP, SOB, n/v, changes in bowel/urinary habits.  12pt ROS otherwise negative.    VITALS  Vital Signs Last 24 Hrs  T(C): 36.6 (24 Jul 2024 09:27), Max: 36.9 (24 Jul 2024 00:27)  T(F): 97.8 (24 Jul 2024 09:27), Max: 98.4 (24 Jul 2024 00:27)  HR: 72 (24 Jul 2024 09:27) (57 - 72)  BP: 123/80 (24 Jul 2024 09:27) (117/73 - 152/80)  BP(mean): 100 (23 Jul 2024 20:54) (99 - 107)  RR: 18 (24 Jul 2024 09:27) (13 - 21)  SpO2: 98% (24 Jul 2024 09:27) (95% - 100%)    Parameters below as of 24 Jul 2024 09:27  Patient On (Oxygen Delivery Method): room air        I&O's Summary    23 Jul 2024 07:01  -  24 Jul 2024 07:00  --------------------------------------------------------  IN: 110 mL / OUT: 535 mL / NET: -425 mL    24 Jul 2024 07:01  -  24 Jul 2024 14:44  --------------------------------------------------------  IN: 0 mL / OUT: 1315 mL / NET: -1315 mL        CAPILLARY BLOOD GLUCOSE          PHYSICAL EXAM  General: A&Ox3; NAD  Head:  PERRL; EOMI; anicteric sclera; head wrapped, drain in place  Neck: Supple; no JVD  Respiratory: CTA B/L; no wheezes/crackles/rales auscultated w/ good air movement  Cardiovascular: Regular rhythm/rate; S1/S2; no gallops Holosystolic murmur with click over mitral area   Gastrointestinal: Soft; NTND w/out rebound tenderness or guarding; bowel sounds normal  Extremities: WWP; no edema or cyanosis; radial/pedal pulses palpable  Neurological:  CNII-XII grossly intact; no obvious focal deficits  Skin: No rashes noted  Vasc: +2 DP/PT pulses b/l   Psych: Appropriate Affect    MEDICATIONS  (STANDING):  ceFAZolin   IVPB 2000 milliGRAM(s) IV Intermittent every 8 hours  chlorhexidine 2% Cloths 1 Application(s) Topical <User Schedule>  escitalopram 10 milliGRAM(s) Oral daily  levothyroxine 50 MICROGram(s) Oral daily  loratadine 10 milliGRAM(s) Oral daily  polyethylene glycol 3350 17 Gram(s) Oral daily  scopolamine 1 mG/72 Hr(s) Patch 1 Patch Transdermal every 72 hours  senna 2 Tablet(s) Oral at bedtime    MEDICATIONS  (PRN):  acetaminophen     Tablet .. 650 milliGRAM(s) Oral every 6 hours PRN Temp greater or equal to 38C (100.4F), Mild Pain (1 - 3)  oxyCODONE    IR 5 milliGRAM(s) Oral every 4 hours PRN Moderate Pain (4 - 6)      Decadron (Flushing)  gabapentin (Rash)  latex (Flushing)  tetracycline (Flushing)  cefuroxime (Flushing)  Zithromax (Flushing)      LABS                        11.7   7.79  )-----------( 175      ( 24 Jul 2024 05:30 )             36.2     07-24    132<L>  |  100  |  9   ----------------------------<  110<H>  4.3   |  25  |  0.54    Ca    8.5      24 Jul 2024 05:30  Phos  3.4     07-24  Mg     1.9     07-24    TPro  7.3  /  Alb  4.7  /  TBili  0.2  /  DBili  x   /  AST  19  /  ALT  17  /  AlkPhos  52  07-22    PT/INR - ( 22 Jul 2024 18:13 )   PT: 10.5 sec;   INR: 0.92          PTT - ( 22 Jul 2024 18:13 )  PTT:30.4 sec  Urinalysis Basic - ( 24 Jul 2024 05:30 )    Color: x / Appearance: x / SG: x / pH: x  Gluc: 110 mg/dL / Ketone: x  / Bili: x / Urobili: x   Blood: x / Protein: x / Nitrite: x   Leuk Esterase: x / RBC: x / WBC x   Sq Epi: x / Non Sq Epi: x / Bacteria: x            IMAGING/EKG/ETC: reviewed

## 2024-07-24 NOTE — PROGRESS NOTE ADULT - SUBJECTIVE AND OBJECTIVE BOX
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 Mcbride's outpatient office with complaints of wound drainage from surgical site incision that has started on Friday. Patient denies headaches, nausea/vomiting, lightheadedness, dizziness, fevers, and chills. Patient denies pain to the surgical site. Admitted to neurosurgery for wound drainage from surgical site incision.  (22 Jul 2024 18:27)    INTERVAL EVENTS: POD 1, NAZIA jones    Hospital course:   7/22: admit to neurosurgery for wound drainge from surgical site. MRI head w/ and w/o orderd. Infectious workup ordered (ESR/CRP/Blood cultures)   7/23: NAZIA ovn, OR today, POD 0 excisional debridement of wound of scalp including bone  7/24: POD 1, NAZIA ovn    Vital Signs Last 24 Hrs  T(C): 36.7 (23 Jul 2024 21:30), Max: 36.7 (23 Jul 2024 21:30)  T(F): 98 (23 Jul 2024 21:30), Max: 98 (23 Jul 2024 21:30)  HR: 64 (23 Jul 2024 21:30) (56 - 65)  BP: 152/80 (23 Jul 2024 21:30) (114/74 - 152/80)  BP(mean): 100 (23 Jul 2024 20:54) (99 - 107)  RR: 18 (23 Jul 2024 21:30) (13 - 21)  SpO2: 95% (23 Jul 2024 21:30) (95% - 100%)    Parameters below as of 23 Jul 2024 21:30  Patient On (Oxygen Delivery Method): room air        I&O's Summary    23 Jul 2024 07:01  -  23 Jul 2024 22:06  --------------------------------------------------------  IN: 110 mL / OUT: 20 mL / NET: 90 mL        PHYSICAL EXAM:  General: patient seen laying supine in bed in NAD  Neuro: AAOx3, FC, OE spontaneously, speech clear and fluent, CNII-XI grossly intact, face symmetric, no pronator drift, finger to nose intact, strength 5/5 b/l UE and LE, sensation intact to light touch throughout  HEENT: PERRL, EOMI  Neck: supple  Cardiac: RRR, S1S2  Pulmonary: chest rise symmetric  Abdomen: soft, nontender, nondistended  Ext: perfusing well  Skin: warm, dry  Wound: + headwrap in place c/d/i, SGJP x1     LABS:                        11.9   4.29  )-----------( 177      ( 23 Jul 2024 07:06 )             36.2     07-23    141  |  105  |  18  ----------------------------<  99  4.9   |  29  |  0.74    Ca    9.2      23 Jul 2024 07:06  Phos  3.9     07-23  Mg     2.2     07-23    TPro  7.3  /  Alb  4.7  /  TBili  0.2  /  DBili  x   /  AST  19  /  ALT  17  /  AlkPhos  52  07-22    PT/INR - ( 22 Jul 2024 18:13 )   PT: 10.5 sec;   INR: 0.92          PTT - ( 22 Jul 2024 18:13 )  PTT:30.4 sec  Urinalysis Basic - ( 23 Jul 2024 07:06 )    Color: x / Appearance: x / SG: x / pH: x  Gluc: 99 mg/dL / Ketone: x  / Bili: x / Urobili: x   Blood: x / Protein: x / Nitrite: x   Leuk Esterase: x / RBC: x / WBC x   Sq Epi: x / Non Sq Epi: x / Bacteria: x          CAPILLARY BLOOD GLUCOSE          Drug Levels: [] N/A    CSF Analysis: [] N/A      Allergies    Decadron (Flushing)  gabapentin (Rash)  latex (Flushing)  tetracycline (Flushing)  cefuroxime (Flushing)  Zithromax (Flushing)    Intolerances        Home Medications:  acetaminophen 500 mg oral tablet: 2 tab(s) orally every 8 hours (21 Jun 2024 12:16)  escitalopram 10 mg oral tablet: 1 tab(s) orally once a day (19 Jun 2024 06:37)  estradiol-norethindrone 0.5 mg-0.1 mg oral tablet: 1 tab(s) orally once a day (19 Jun 2024 06:37)  levothyroxine 50 mcg (0.05 mg) oral tablet: 1 tab(s) orally once a day (19 Jun 2024 06:37)  senna leaf extract oral tablet: 2 tab(s) orally once a day (at bedtime) (21 Jun 2024 12:16)      MEDICATIONS:  MEDICATIONS  (STANDING):  chlorhexidine 2% Cloths 1 Application(s) Topical <User Schedule>  escitalopram 10 milliGRAM(s) Oral daily  levothyroxine 50 MICROGram(s) Oral daily  loratadine 10 milliGRAM(s) Oral daily  polyethylene glycol 3350 17 Gram(s) Oral daily  scopolamine 1 mG/72 Hr(s) Patch 1 Patch Transdermal every 72 hours  senna 2 Tablet(s) Oral at bedtime  sodium chloride 0.9%. 1000 milliLiter(s) (55 mL/Hr) IV Continuous <Continuous>    MEDICATIONS  (PRN):  acetaminophen     Tablet .. 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 Mcbride's outpatient office with complaints of wound drainage from surgical site incision that has started on Friday. Patient denies headaches, nausea/vomiting, lightheadedness, dizziness, fevers, and chills. Patient denies pain to the surgical site. Admitted to to [facility] for further management. Now s/p excisional debridement of wound of scalp including bone (7/23).    Neuro  - neuro/vitals q4  - SGJP x1, monitor output  - MRI head w/ and w/o done  - Anxiety/depression: lexapro 10mg qd    Cardiology   - SBP <160    Respiratory   - RA, IS    GI   - ADAT  - bowel regimen     Renal   - IVF while advancing diet    Endocrine   - a1c 5.4   - hx of hypothyroidism, c/w synthroid 50 mcg daily.     ID  - afebrile  - post-op ancef  - f/u OR cx from 7/23  - ESR/CRP negative  - blood cultures NGTD     Heme  - dvt ppx: SCDs    Dispo: regional, full code, OR pending    Case discussed with Dr. Mcbride .      Assessment: present when checked     [] GCS   E   V   M     Heart Failure: [] Acute, [] acute on chronic, [] chronic   Heart Failure: [] Diastolic (HFpEF), [] Systolic (HRrEF), [] Combined (HFpEF and HFrEF), [] RHF, [] Pulm HTN, [] Other     [] JOHN, [] ATN, [] AIN, [] other   [] CKD1, [] CKD2, [] CKD3, [] CKD4, [] CKD5, [] ESRD     Encephalopathy: [] Metabolic, [] Hepatic, [] Toxic, [] Neurological, [] Other     Abnormal Nutritional Status: [] malnutrition (see nutrition note), []underweight: BMI <19, [] morbid obesity: BMI >40, [] Cachexia     [] Sepsis   [] Hypovolemic shock, [] Cardiogenic shock, [] Hemorrhagic shock, [] Neurogenic shock   [] Acute respiratory failure   [] Cerebral edema, [] Brain compression / herniation   [] Functional quadriplegia   [] Acute blood loss anemia

## 2024-07-24 NOTE — PROGRESS NOTE ADULT - ASSESSMENT
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 Mcbride's outpatient office with complaints of wound drainage from surgical site incision that has started on Friday. Patient denies headaches, nausea/vomiting, lightheadedness, dizziness, fevers, and chills. Patient denies pain to the surgical site. Admitted to to [facility] for further management.

## 2024-07-25 ENCOUNTER — APPOINTMENT (OUTPATIENT)
Dept: NEUROSURGERY | Facility: CLINIC | Age: 59
End: 2024-07-25

## 2024-07-25 LAB
ANION GAP SERPL CALC-SCNC: 8 MMOL/L — SIGNIFICANT CHANGE UP (ref 5–17)
BUN SERPL-MCNC: 11 MG/DL — SIGNIFICANT CHANGE UP (ref 7–23)
CALCIUM SERPL-MCNC: 9 MG/DL — SIGNIFICANT CHANGE UP (ref 8.4–10.5)
CHLORIDE SERPL-SCNC: 102 MMOL/L — SIGNIFICANT CHANGE UP (ref 96–108)
CO2 SERPL-SCNC: 28 MMOL/L — SIGNIFICANT CHANGE UP (ref 22–31)
CREAT SERPL-MCNC: 0.67 MG/DL — SIGNIFICANT CHANGE UP (ref 0.5–1.3)
EGFR: 101 ML/MIN/1.73M2 — SIGNIFICANT CHANGE UP
GLUCOSE SERPL-MCNC: 100 MG/DL — HIGH (ref 70–99)
HCT VFR BLD CALC: 37.7 % — SIGNIFICANT CHANGE UP (ref 34.5–45)
HGB BLD-MCNC: 12.2 G/DL — SIGNIFICANT CHANGE UP (ref 11.5–15.5)
MAGNESIUM SERPL-MCNC: 2.1 MG/DL — SIGNIFICANT CHANGE UP (ref 1.6–2.6)
MCHC RBC-ENTMCNC: 31.6 PG — SIGNIFICANT CHANGE UP (ref 27–34)
MCHC RBC-ENTMCNC: 32.4 GM/DL — SIGNIFICANT CHANGE UP (ref 32–36)
MCV RBC AUTO: 97.7 FL — SIGNIFICANT CHANGE UP (ref 80–100)
NRBC # BLD: 0 /100 WBCS — SIGNIFICANT CHANGE UP (ref 0–0)
PHOSPHATE SERPL-MCNC: 3.5 MG/DL — SIGNIFICANT CHANGE UP (ref 2.5–4.5)
PLATELET # BLD AUTO: 180 K/UL — SIGNIFICANT CHANGE UP (ref 150–400)
POTASSIUM SERPL-MCNC: 4.1 MMOL/L — SIGNIFICANT CHANGE UP (ref 3.5–5.3)
POTASSIUM SERPL-SCNC: 4.1 MMOL/L — SIGNIFICANT CHANGE UP (ref 3.5–5.3)
RBC # BLD: 3.86 M/UL — SIGNIFICANT CHANGE UP (ref 3.8–5.2)
RBC # FLD: 12.2 % — SIGNIFICANT CHANGE UP (ref 10.3–14.5)
SODIUM SERPL-SCNC: 138 MMOL/L — SIGNIFICANT CHANGE UP (ref 135–145)
WBC # BLD: 5.63 K/UL — SIGNIFICANT CHANGE UP (ref 3.8–10.5)
WBC # FLD AUTO: 5.63 K/UL — SIGNIFICANT CHANGE UP (ref 3.8–10.5)

## 2024-07-25 PROCEDURE — 99231 SBSQ HOSP IP/OBS SF/LOW 25: CPT

## 2024-07-25 PROCEDURE — 99222 1ST HOSP IP/OBS MODERATE 55: CPT

## 2024-07-25 PROCEDURE — 99232 SBSQ HOSP IP/OBS MODERATE 35: CPT

## 2024-07-25 RX ORDER — MUPIROCIN CALCIUM 20 MG/G
1 CREAM TOPICAL
Qty: 1 | Refills: 0
Start: 2024-07-25 | End: 2024-07-31

## 2024-07-25 RX ORDER — MUPIROCIN CALCIUM 20 MG/G
1 CREAM TOPICAL
Refills: 0 | Status: DISCONTINUED | OUTPATIENT
Start: 2024-07-25 | End: 2024-08-01

## 2024-07-25 RX ORDER — OXYCODONE HYDROCHLORIDE 30 MG/1
1 TABLET ORAL
Qty: 20 | Refills: 0
Start: 2024-07-25 | End: 2024-07-29

## 2024-07-25 RX ORDER — ONDANSETRON HCL/PF 4 MG/2 ML
1 VIAL (ML) INJECTION
Qty: 15 | Refills: 0
Start: 2024-07-25 | End: 2024-07-29

## 2024-07-25 RX ORDER — LORATADINE 10 MG
17 TABLET,DISINTEGRATING ORAL
Qty: 0 | Refills: 0 | DISCHARGE
Start: 2024-07-25

## 2024-07-25 RX ADMIN — Medication 50 MICROGRAM(S): at 05:48

## 2024-07-25 RX ADMIN — OXYCODONE HYDROCHLORIDE 5 MILLIGRAM(S): 30 TABLET ORAL at 00:16

## 2024-07-25 RX ADMIN — MUPIROCIN CALCIUM 1 APPLICATION(S): 20 CREAM TOPICAL at 15:00

## 2024-07-25 RX ADMIN — Medication 100 MILLIGRAM(S): at 00:16

## 2024-07-25 RX ADMIN — Medication 650 MILLIGRAM(S): at 06:48

## 2024-07-25 RX ADMIN — Medication 650 MILLIGRAM(S): at 05:48

## 2024-07-25 RX ADMIN — Medication 100 MILLIGRAM(S): at 09:00

## 2024-07-25 RX ADMIN — OXYCODONE HYDROCHLORIDE 5 MILLIGRAM(S): 30 TABLET ORAL at 23:29

## 2024-07-25 RX ADMIN — CHLORHEXIDINE GLUCONATE 1 APPLICATION(S): 500 CLOTH TOPICAL at 06:54

## 2024-07-25 RX ADMIN — Medication 100 MILLIGRAM(S): at 17:38

## 2024-07-25 RX ADMIN — OXYCODONE HYDROCHLORIDE 5 MILLIGRAM(S): 30 TABLET ORAL at 01:00

## 2024-07-25 RX ADMIN — Medication 10 MILLIGRAM(S): at 12:24

## 2024-07-25 RX ADMIN — Medication 10 MILLIGRAM(S): at 12:23

## 2024-07-25 NOTE — DISCHARGE NOTE PROVIDER - NSDCMRMEDTOKEN_GEN_ALL_CORE_FT
acetaminophen 500 mg oral tablet: 2 tab(s) orally every 8 hours as needed for  mild pain  escitalopram 10 mg oral tablet: 1 tab(s) orally once a day  estradiol-norethindrone 0.5 mg-0.1 mg oral tablet: 1 tab(s) orally once a day  levothyroxine 50 mcg (0.05 mg) oral tablet: 1 tab(s) orally once a day  mupirocin 2% topical ointment: Apply topically to affected area 2 times a day  ondansetron 4 mg oral tablet: 1 tab(s) orally every 8 hours as needed for  nausea MDD: 3  oxyCODONE 5 mg oral tablet: 1 tab(s) orally every 6 hours as needed for Moderate Pain (4 - 6) MDD: max 4 tabs/day  polyethylene glycol 3350 oral powder for reconstitution: 17 gram(s) orally once a day as needed for  constipation  senna leaf extract oral tablet: 2 tab(s) orally once a day (at bedtime)   acetaminophen 500 mg oral tablet: 2 tab(s) orally every 8 hours as needed for  mild pain  escitalopram 10 mg oral tablet: 1 tab(s) orally once a day  estradiol-norethindrone 0.5 mg-0.1 mg oral tablet: 1 tab(s) orally once a day  levothyroxine 50 mcg (0.05 mg) oral tablet: 1 tab(s) orally once a day  mupirocin 2% topical ointment: Apply topically to affected area 2 times a day  Normal Saline Flushes: 10 cc normal saline flush before and after each antibiotic infusion  ondansetron 4 mg oral tablet: 1 tab(s) orally every 8 hours as needed for  nausea MDD: 3  oxyCODONE 5 mg oral tablet: 1 tab(s) orally every 6 hours as needed for Moderate Pain (4 - 6) MDD: max 4 tabs/day  polyethylene glycol 3350 oral powder for reconstitution: 17 gram(s) orally once a day as needed for  constipation  senna leaf extract oral tablet: 2 tab(s) orally once a day (at bedtime)  Vancomycin 750mg every 12 hours intravenously: End date 8/4/24  Weekly labs: CBC with differential, CMP, ESR, CRP; Fax to Dr. Lucio ([phone])   acetaminophen 500 mg oral tablet: 2 tab(s) orally every 8 hours as needed for  mild pain  escitalopram 10 mg oral tablet: 1 tab(s) orally once a day  estradiol-norethindrone 0.5 mg-0.1 mg oral tablet: 1 tab(s) orally once a day  levothyroxine 50 mcg (0.05 mg) oral tablet: 1 tab(s) orally once a day  mupirocin 2% topical ointment: Apply topically to affected area 2 times a day  Normal Saline Flushes: 10 cc normal saline flush before and after each antibiotic infusion  ondansetron 4 mg oral tablet: 1 tab(s) orally every 8 hours as needed for  nausea MDD: 3  polyethylene glycol 3350 oral powder for reconstitution: 17 gram(s) orally once a day as needed for  constipation  senna leaf extract oral tablet: 2 tab(s) orally once a day (at bedtime)  vancomycin 1.25 g intravenous injection: 1.25 gram(s) intravenous every 12 hours  Weekly labs: CBC with differential, CMP, ESR, CRP; Fax to Dr. Lucio ([phone])   acetaminophen 500 mg oral tablet: 2 tab(s) orally every 8 hours as needed for  mild pain  escitalopram 10 mg oral tablet: 1 tab(s) orally once a day  estradiol-norethindrone 0.5 mg-0.1 mg oral tablet: 1 tab(s) orally once a day  levothyroxine 50 mcg (0.05 mg) oral tablet: 1 tab(s) orally once a day  Normal Saline Flushes: 10 cc normal saline flush before and after each antibiotic infusion  ondansetron 4 mg oral tablet: 1 tab(s) orally every 8 hours as needed for  nausea MDD: 3  polyethylene glycol 3350 oral powder for reconstitution: 17 gram(s) orally once a day as needed for  constipation  senna leaf extract oral tablet: 2 tab(s) orally once a day (at bedtime)  vancomycin 1.25 g intravenous injection: 1.25 gram(s) intravenous every 12 hours  Weekly labs: CBC with differential, CMP, ESR, CRP; Fax to Dr. Lucio ([phone])

## 2024-07-25 NOTE — DISCHARGE NOTE PROVIDER - NSDCCPCAREPLAN_GEN_ALL_CORE_FT
PRINCIPAL DISCHARGE DIAGNOSIS  Diagnosis: Drainage from wound  Assessment and Plan of Treatment:       SECONDARY DISCHARGE DIAGNOSES  Diagnosis: Anxiety and depression  Assessment and Plan of Treatment:     Diagnosis: Hypothyroidism  Assessment and Plan of Treatment:      PRINCIPAL DISCHARGE DIAGNOSIS  Diagnosis: Drainage from wound  Assessment and Plan of Treatment: Now s/p excisional debridement of wound of scalp including bone (7/23).        SECONDARY DISCHARGE DIAGNOSES  Diagnosis: Hypothyroidism  Assessment and Plan of Treatment:     Diagnosis: MVP (mitral valve prolapse)  Assessment and Plan of Treatment:     Diagnosis: Anxiety and depression  Assessment and Plan of Treatment:     Diagnosis: Osteoarthritis  Assessment and Plan of Treatment:     Diagnosis: History of eczema  Assessment and Plan of Treatment:

## 2024-07-25 NOTE — DISCHARGE NOTE PROVIDER - PROVIDER TOKENS
PROVIDER:[TOKEN:[[id number]]],PROVIDER:[TOKEN:[[id number]]] PROVIDER:[TOKEN:[[id number]]],PROVIDER:[TOKEN:[[id number]]],PROVIDER:[TOKEN:[[id number]]]

## 2024-07-25 NOTE — DISCHARGE NOTE PROVIDER - NSDCFUADDAPPT_GEN_ALL_CORE_FT
Please follow up with Dr. Camp 8/13 at 1pm   Please follow up with Dr. Camp in the outpatient office on 8/13 at 1:00PM. Call [phone] to confirm appointment date and time.     Please also follow up with your Primary Care Doctor.    Please follow up with Dr. Camp in the outpatient office on 8/13 at 1:00PM. Call [phone] to confirm appointment date and time.     Please follow up with Infectious Disease. Patient to follow up with Dr. Lucio in 2 weeks ([address] [place] [address], [phone]), ID office will call patient to schedule.     Please also follow up with your Primary Care Doctor.    Please follow up with Dr. Camp and Dr. Mcbride in the outpatient office on 8/13 at 1:00PM. Call [phone] to confirm appointment date and time.     Please follow up with Infectious Disease. Patient to follow up with Dr. Lucio in 2 weeks ([address] [place] [address], [phone]), ID office will call patient to schedule.     Please also follow up with your Primary Care Doctor.

## 2024-07-25 NOTE — CONSULT NOTE ADULT - SUBJECTIVE AND OBJECTIVE BOX
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 Mcbride's outpatient office with complaints of wound drainage.  ID consulted for antibiotic management.   Patient states she was doing well after surgery and then last , she noticed thin bloody drainage coming from wound (only when laying down). She denies increase in pain near wound, fevers, chills, nausea/vomiting. She denies water exposure other than shower or injury. She saw Dr. Mcbride in office on  - he accompanied patient to ED for evaluation as there was concern for CSF leak. Upon arrival, afebrile without leukocytosis. Labs notable for ESR 7 and CRP <3.0. MRI  showed Small postoperative collection within the left cervico-occipital   soft tissues overlying the craniotomy, Unchanged nonenhancing fluid signal within the left petrous apex and similar appearance of extradural fluid within the left dorsal clival region extending into the left cerebellopontine/cerebellomedullary angle. She is s/p Excisional debridement of wound of scalp including bone  -cultures sent and pending. She is stable on cefazolin. HAMMAD drain was removed today.       PAST MEDICAL & SURGICAL HISTORY:  MVP (mitral valve prolapse)      Atypical facial pain      Eczema      H/O headache      Osteoarthritis      H/O bone cyst  IN HER SKULL      Hypothyroidism      Endometriosis      H/O myomectomy      H/O carpal tunnel repair  BILAT       product of IVF pregnancy  x 1            Review of Systems:   Constitutional, eyes, ENT, cardiovascular, respiratory, gastrointestinal, genitourinary, integumentary, neurological, psychiatric and heme/lymph are otherwise negative other than noted above       ANTIBIOTICS:  MEDICATIONS  (STANDING):  ceFAZolin   IVPB 2000 milliGRAM(s) IV Intermittent every 8 hours  chlorhexidine 2% Cloths 1 Application(s) Topical <User Schedule>  escitalopram 10 milliGRAM(s) Oral daily  levothyroxine 50 MICROGram(s) Oral daily  loratadine 10 milliGRAM(s) Oral daily  mupirocin 2% Ointment 1 Application(s) Topical two times a day  polyethylene glycol 3350 17 Gram(s) Oral daily  scopolamine 1 mG/72 Hr(s) Patch 1 Patch Transdermal every 72 hours  senna 2 Tablet(s) Oral at bedtime    MEDICATIONS  (PRN):  acetaminophen     Tablet .. 650 milliGRAM(s) Oral every 6 hours PRN Temp greater or equal to 38C (100.4F), Mild Pain (1 - 3)  oxyCODONE    IR 5 milliGRAM(s) Oral every 4 hours PRN Moderate Pain (4 - 6)      Allergies    Decadron (Flushing)  gabapentin (Rash)  strawberry (Unknown)  latex (Flushing)  tetracycline (Flushing)  cefuroxime (Flushing)  Zithromax (Flushing)    Intolerances    SOCIAL HISTORY:  -lives in [place], [place]  -works remotely in [employer]   -denies tobacco  -etoh socially   -denies recreational drug use   -has mini doodle (was not licking near wound or scratching)     FAMILY HISTORY:   no FH leading to current infection    Vital Signs Last 24 Hrs  T(C): 36.6 (2024 08:54), Max: 37 (2024 20:33)  T(F): 97.9 (2024 08:54), Max: 98.6 (2024 20:33)  HR: 59 (2024 08:54) (59 - 65)  BP: 130/79 (2024 08:54) (122/75 - 135/77)  BP(mean): --  RR: 17 (2024 08:54) (17 - 18)  SpO2: 98% (2024 08:54) (97% - 98%)    Parameters below as of 2024 08:54  Patient On (Oxygen Delivery Method): room air        24 @ 07:01  -  24 @ 07:00  --------------------------------------------------------  IN: 0 mL / OUT: 1320 mL / NET: -1320 mL        PHYSICAL EXAM:  Constitutional: alert, NAD  Eyes: the sclera and conjunctiva were normal.   Head: Wound noted L base of neck. not actively draining   ENT: the ears and nose were normal in appearance.   Neck: the appearance of the neck was normal and the neck was supple.   Pulmonary: no respiratory distress and lungs were clear to auscultation bilaterally.   Heart: heart rate was normal and rhythm regular, normal S1 and S2  Vascular:. there was no peripheral edema  Abdomen: normal bowel sounds, soft, non-tender  Neurological: no focal deficits.   Psychiatric: the affect was normal      LABS:                        12.2   5.63  )-----------( 180      ( 2024 05:30 )             37.7         138  |  102  |  11  ----------------------------<  100<H>  4.1   |  28  |  0.67    Ca    9.0      2024 05:30  Phos  3.5       Mg     2.1             Urinalysis Basic - ( 2024 05:30 )    Color: x / Appearance: x / SG: x / pH: x  Gluc: 100 mg/dL / Ketone: x  / Bili: x / Urobili: x   Blood: x / Protein: x / Nitrite: x   Leuk Esterase: x / RBC: x / WBC x   Sq Epi: x / Non Sq Epi: x / Bacteria: x        MICROBIOLOGY:    Culture - Surgical Swab (collected 24 @ 17:00)  Source: .Surgical Swab explanted mesh or spec  Preliminary Report (24 @ 17:40):    No growth    Culture - Surgical Swab (collected 24 @ 17:00)  Source: .Surgical Swab subnoccipital wound or spec  Preliminary Report (24 @ 18:12):    No growth    Culture - Surgical Swab (collected 24 @ 17:00)  Source: .Surgical Swab subnoccipital superficial wound or spec  Preliminary Report (24 @ 18:05):    No growth    Culture - Surgical Swab (collected 24 @ 17:00)  Source: .Surgical Swab subnoccipital  wound  deep or spec  Preliminary Report (24 @ 18:11):    No growth    Culture - Blood (collected 24 @ 18:45)  Source: .Blood Blood-Peripheral  Preliminary Report (24 @ 01:03):    No growth at 48 Hours    Culture - Blood (collected 24 @ 18:30)  Source: .Blood Blood-Peripheral  Preliminary Report (24 @ 01:03):    No growth at 48 Hours        RADIOLOGY & ADDITIONAL STUDIES:  reviewed

## 2024-07-25 NOTE — DISCHARGE NOTE PROVIDER - NSDCFUSCHEDAPPT_GEN_ALL_CORE_FT
[facility] Physician [facility]  OTOLARYNG [address]  Scheduled Appointment: 08/26/2024     Buddy Camp  [facility] Physician [facility]  NEUROSURG [address]  Scheduled Appointment: 08/13/2024    [facility]  OTOLARYNG [address]  Scheduled Appointment: 08/26/2024     Buddy Camp  [facility]  NEUROSURG [address]  Scheduled Appointment: 08/13/2024    Victor Hugo Lucio  [facility]  INFDISEASE [address]  Scheduled Appointment: 08/14/2024    [facility]  OTOLARYNG [address]  Scheduled Appointment: 08/26/2024

## 2024-07-25 NOTE — PROGRESS NOTE ADULT - SUBJECTIVE AND OBJECTIVE BOX
Patient is a 59y old  Female who presents with a chief complaint of wound drainage from surgical site (25 Jul 2024 13:28)    INTERVAL EVENTS:  tolerating diet, no nausea,   no dysuria, no constipation    SUBJECTIVE:  Patient was seen and examined at bedside.  Review of systems: No CP, dyspnea, nausea or vomiting, dysuria, LE edema.     Diet, Regular (07-23-24 @ 21:51) [Active]    MEDICATIONS:  MEDICATIONS  (STANDING):  ceFAZolin   IVPB 2000 milliGRAM(s) IV Intermittent every 8 hours  chlorhexidine 2% Cloths 1 Application(s) Topical <User Schedule>  escitalopram 10 milliGRAM(s) Oral daily  levothyroxine 50 MICROGram(s) Oral daily  loratadine 10 milliGRAM(s) Oral daily  mupirocin 2% Ointment 1 Application(s) Topical two times a day  polyethylene glycol 3350 17 Gram(s) Oral daily  scopolamine 1 mG/72 Hr(s) Patch 1 Patch Transdermal every 72 hours  senna 2 Tablet(s) Oral at bedtime    MEDICATIONS  (PRN):  acetaminophen     Tablet .. 650 milliGRAM(s) Oral every 6 hours PRN Temp greater or equal to 38C (100.4F), Mild Pain (1 - 3)  oxyCODONE    IR 5 milliGRAM(s) Oral every 4 hours PRN Moderate Pain (4 - 6)      Allergies    Decadron (Flushing)  gabapentin (Rash)  strawberry (Unknown)  latex (Flushing)  tetracycline (Flushing)  cefuroxime (Flushing)  Zithromax (Flushing)    Intolerances        OBJECTIVE:  Vital Signs Last 24 Hrs  T(C): 36.9 (25 Jul 2024 15:46), Max: 37 (24 Jul 2024 20:33)  T(F): 98.4 (25 Jul 2024 15:46), Max: 98.6 (24 Jul 2024 20:33)  HR: 61 (25 Jul 2024 15:46) (59 - 61)  BP: 124/74 (25 Jul 2024 15:46) (124/74 - 135/77)  BP(mean): --  RR: 16 (25 Jul 2024 15:46) (16 - 18)  SpO2: 96% (25 Jul 2024 15:46) (96% - 98%)    Parameters below as of 25 Jul 2024 15:46  Patient On (Oxygen Delivery Method): room air      I&O's Summary    24 Jul 2024 07:01  -  25 Jul 2024 07:00  --------------------------------------------------------  IN: 0 mL / OUT: 1320 mL / NET: -1320 mL        PHYSICAL EXAM:  Gen: Reclining in bed at time of exam, appears stated age  HEENT: MMM, clear OP; head wrapped in clean dry, kerlix   Neck: trachea at midline  CV: RRR, +S1/S2  Pulm: adequate respiratory effort, no increase in work of breathing  Abd: soft, ND  Skin: warm and dry,   Ext: no LE edema   Neuro: AOx3, speaking in full sentences  Psych: affect and behavior appropriate, pleasant at time of interview    LABS:                        12.2   5.63  )-----------( 180      ( 25 Jul 2024 05:30 )             37.7     07-25    138  |  102  |  11  ----------------------------<  100<H>  4.1   |  28  |  0.67    Ca    9.0      25 Jul 2024 05:30  Phos  3.5     07-25  Mg     2.1     07-25          CAPILLARY BLOOD GLUCOSE        Urinalysis Basic - ( 25 Jul 2024 05:30 )    Color: x / Appearance: x / SG: x / pH: x  Gluc: 100 mg/dL / Ketone: x  / Bili: x / Urobili: x   Blood: x / Protein: x / Nitrite: x   Leuk Esterase: x / RBC: x / WBC x   Sq Epi: x / Non Sq Epi: x / Bacteria: x        MICRODATA:    Culture - Surgical Swab (collected 23 Jul 2024 17:00)  Source: .Surgical Swab explanted mesh or spec  Preliminary Report (24 Jul 2024 17:40):    No growth    Culture - Surgical Swab (collected 23 Jul 2024 17:00)  Source: .Surgical Swab subnoccipital wound or spec  Preliminary Report (24 Jul 2024 18:12):    No growth    Culture - Surgical Swab (collected 23 Jul 2024 17:00)  Source: .Surgical Swab subnoccipital superficial wound or spec  Preliminary Report (24 Jul 2024 18:05):    No growth    Culture - Surgical Swab (collected 23 Jul 2024 17:00)  Source: .Surgical Swab subnoccipital  wound  deep or spec  Preliminary Report (24 Jul 2024 18:11):    No growth    Culture - Blood (collected 22 Jul 2024 18:45)  Source: .Blood Blood-Peripheral  Preliminary Report (25 Jul 2024 01:03):    No growth at 48 Hours    Culture - Blood (collected 22 Jul 2024 18:30)  Source: .Blood Blood-Peripheral  Preliminary Report (25 Jul 2024 01:03):    No growth at 48 Hours        RADIOLOGY/OTHER STUDIES:

## 2024-07-25 NOTE — CONSULT NOTE ADULT - ASSESSMENT
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 Mcbride's outpatient office with complaints of wound drainage found to have wound dehiscence s/p Excisional debridement of wound of scalp including bone 7/23. Per NSGY, all superficial, low concern for OM. Patient is stable and well appearing- afebrile, no leukocytosis, ESR/CRP wnl. Bcxs 7/22 ngtd. OR cultures 7/23 ngtd. Doing well on cefazolin     Suggest:  -f/u bcxs   -f/u OR cultures   -continue cefazolin 2g IV Q8   -anticipate transition to PO antibiotics, would like to watch OR cultures at least one more day     Team 2 will follow you.    Case d/w primary team.  Final recommendation pending attending note.    Vandana Stanford, Infectious Diseases PA  Please reach out for any questions 9 am-5pm.   For evenings and weekends, please call the ID physician on call.

## 2024-07-25 NOTE — DISCHARGE NOTE PROVIDER - NSDCCPTREATMENT_GEN_ALL_CORE_FT
PRINCIPAL PROCEDURE  Procedure: Excisional debridement of wound of scalp including bone  Findings and Treatment: 7/23

## 2024-07-25 NOTE — PROCEDURE NOTE - GENERAL PROCEDURE DETAILS
prepped and cleaned with chlorhexidine, HAMMAD taken off suction, anchor suture removed, drain removed without resistance and end of catheter visualized upon removal, drain site covered with xeroform and kerlex wrap.

## 2024-07-25 NOTE — PROGRESS NOTE ADULT - SUBJECTIVE AND OBJECTIVE BOX
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 Mcbride's outpatient office with complaints of wound drainage from surgical site incision that has started on Friday. Patient denies headaches, nausea/vomiting, lightheadedness, dizziness, fevers, and chills. Patient denies pain to the surgical site. Admitted to neurosurgery for wound drainage from surgical site incision.  (22 Jul 2024 18:27)    OVERNIGHT EVENTS: No events overnight.     Hospital Course:   7/22: admit to neurosurgery for wound drainge from surgical site. MRI head w/ and w/o orderd. Infectious workup ordered (ESR/CRP/Blood cultures)   7/23: NAZIA ovn, OR today, POD 0 excisional debridement of wound of scalp including bone  7/24: POD 1, NAZIA ovn.   7/25: POD 2, NAZIA overnight. OR cxs show NGTD. Pending ID consult.     Vital Signs Last 24 Hrs  T(C): 37 (24 Jul 2024 20:33), Max: 37 (24 Jul 2024 20:33)  T(F): 98.6 (24 Jul 2024 20:33), Max: 98.6 (24 Jul 2024 20:33)  HR: 61 (24 Jul 2024 20:33) (61 - 72)  BP: 124/76 (24 Jul 2024 20:33) (122/75 - 126/73)  BP(mean): --  RR: 18 (24 Jul 2024 20:33) (17 - 18)  SpO2: 97% (24 Jul 2024 20:33) (95% - 98%)    Parameters below as of 24 Jul 2024 20:33  Patient On (Oxygen Delivery Method): room air    I&O's Summary    23 Jul 2024 07:01  -  24 Jul 2024 07:00  --------------------------------------------------------  IN: 110 mL / OUT: 535 mL / NET: -425 mL    24 Jul 2024 07:01  -  25 Jul 2024 00:50  --------------------------------------------------------  IN: 0 mL / OUT: 1315 mL / NET: -1315 mL    PHYSICAL EXAM:  Constitutional: NAD, resting comfortably in bed.   HEENT: Pupils equal, round, reactive to light. EOMI. Face symmetric, tongue midline.  Respiratory: No respiratory distress, lungs clear to auscultation bilaterally.   Cardiovascular: RRR, S1, S2.   Gastrointestinal: Abdomen soft, non tender, nondistended.  Neurological:  AAOX3. Opens eyes. Follows commands. Speech clear.  Cranial Nerves: II-XII intact  Motor: 5/5 power in b/l UE and LE  Sensation: intact to light touch in all extremities   Pronator Drift: none  Extremities: Warm, well perfused.  Wounds: headwrap in place, c/d/i    TUBES/LINES:  [] [place]  [] Lumbar Drain  [x] Wound Drains - 1 SG HAMMAD  [] Others    DIET:  [] NPO  [x] Mechanical  [] Tube feeds    LABS:                        11.7   7.79  )-----------( 175      ( 24 Jul 2024 05:30 )             36.2     07-24    138  |  103  |  14  ----------------------------<  113<H>  4.4   |  27  |  0.75    Ca    9.1      24 Jul 2024 15:06  Phos  3.4     07-24  Mg     1.9     07-24    Urinalysis Basic - ( 24 Jul 2024 15:06 )  Color: x / Appearance: x / SG: x / pH: x  Gluc: 113 mg/dL / Ketone: x  / Bili: x / Urobili: x   Blood: x / Protein: x / Nitrite: x   Leuk Esterase: x / RBC: x / WBC x   Sq Epi: x / Non Sq Epi: x / Bacteria: x    CAPILLARY BLOOD GLUCOSE    Drug Levels: [] N/A  CSF Analysis: [] N/A    Allergies  Decadron (Flushing)  gabapentin (Rash)  latex (Flushing)  tetracycline (Flushing)  cefuroxime (Flushing)  Zithromax (Flushing)  Intolerances    MEDICATIONS:  Antibiotics:  ceFAZolin   IVPB 2000 milliGRAM(s) IV Intermittent every 8 hours    Neuro:  acetaminophen     Tablet .. 650 milliGRAM(s) Oral every 6 hours PRN  escitalopram 10 milliGRAM(s) Oral daily  oxyCODONE    IR 5 milliGRAM(s) Oral every 4 hours PRN  scopolamine 1 mG/72 Hr(s) Patch 1 Patch Transdermal every 72 hours    Anticoagulation:    OTHER:  chlorhexidine 2% Cloths 1 Application(s) Topical <User Schedule>  levothyroxine 50 MICROGram(s) Oral daily  loratadine 10 milliGRAM(s) Oral daily  polyethylene glycol 3350 17 Gram(s) Oral daily  senna 2 Tablet(s) Oral at bedtime    IVF:    CULTURES:  Culture Results:   No growth (07-23 @ 17:00)  Culture Results:   No growth (07-23 @ 17:00)    RADIOLOGY & ADDITIONAL TESTS:      ASSESSMENT:    60 y/o female PMH hypothyroidism, depression, s/p left craniotomy, exploration of foramen magnum, 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 Mcbride's outpatient office with complaints of wound drainage from surgical site incision that has started on Friday. Patient denies headaches, nausea/vomiting, lightheadedness, dizziness, fevers, and chills. Patient denies pain to the surgical site. Admitted to to [facility] for further management. Now s/p excisional debridement of wound of scalp including bone (7/23).    Neuro  - neuro/vitals q4  - SGJP x1, monitor output  - MRI head w/ and w/o done  - Anxiety/depression: lexapro 10mg qd    Cardiology   - SBP <160    Respiratory   - RA, IS    GI   - regular diet  - bowel regimen     Renal   - IVL, voiding    Endocrine   - a1c 5.4   - hx of hypothyroidism, c/w synthroid 50 mcg daily.     ID  - afebrile  - postop Ancef  - f/u OR cx from 7/23  - ESR/CRP negative  - blood cultures NGTD     Heme  - dvt ppx: SCDs    Dispo: regional, full code    Case discussed with Dr. Mcbride and Dr. Camp

## 2024-07-25 NOTE — DISCHARGE NOTE PROVIDER - NSDCFUADDINST_GEN_ALL_CORE_FT
Neurosurgery follow up appointment date/time: 8/13 at 1pm  - any sutures/staples in place will be removed at your follow up appointment  - please call the office to make/confirm appointment: [phone]    Wound Care:  - you may shower starting tomorrow and get your incision wet; pat dry with clean towel after and keep incision open to air  - apply mupirocin to incision twice a day  - no picking at incision  - do not let your glasses touch the incision    Activity:  - fatigue is common after surgery, rest if you feel tired   - no bending, lifting, twisting or heavy lifting   - walking is recommended, ambulate as tolerated  - you may shower when you get home, keep your incision dry  - no soaking in a tub/pool/hot tub   - no driving within 24 hours of anesthesia or while taking prescription pain medications   - keep hydrated, drink plenty of water     Please also follow up with your primary care doctor.     Pain Expectations:  - pain after surgery is expected; please take pain meds as prescribed     Medications:  - Continue all home medications  - For pain: Take Tylenol 1000mg every 8 hours as needed for mild pain, oxycodone 5mg every 6 hours as needed for severe pain  - pain medications can cause constipation, you should eat a high fiber diet and may take a stool softener while on pain meds   - Avoid taking Aspirin for pain as they can cause bleeding     Call the office or come to ED if:  - wound has drainage or bleeding, increased redness or pain at incision site, neurological change, fever (>101), chills, night sweats, syncope, nausea/vomiting, chest pain, shortness of breath      Playback:  - A copy of your discharge instructions will be available on [employer]    WITHIN 24 HOURS OF DISCHARGE, PLEASE CONTACT NEURO PA  WITH ANY QUESTIONS OR CONCERNS: [phone]   OTHERWISE, PLEASE CALL THE OFFICE WITH ANY QUESTIONS OR CONCERNS: [phone] Neurosurgery follow up appointment date/time: 8/13 at 1pm  - any sutures/staples in place will be removed at your follow up appointment.   - please call the office to make/confirm appointment: [phone]    Wound Care:  - you may shower starting tomorrow and get your incision wet; pat dry with clean towel after and keep incision open to air  - apply mupirocin to incision twice a day  - no picking at incision  - do not let your glasses touch the incision    Activity:  - fatigue is common after surgery, rest if you feel tired   - no bending, lifting, twisting or heavy lifting   - walking is recommended, ambulate as tolerated  - you may shower when you get home, keep your incision dry  - no soaking in a tub/pool/hot tub   - no driving within 24 hours of anesthesia or while taking prescription pain medications   - keep hydrated, drink plenty of water     Please also follow up with your primary care doctor.     Devices:   -PICC:__________________    Pain Expectations:  - pain after surgery is expected; please take pain meds as prescribed     Medications:  - Continue all home medications: Escitalopram (lexapro) 10mg once daily, Levothyroxine (synthroid) 50mcg daily  -Antibiotics: Vancomycin 1,000mg__________ every 12 hours (until 8/4) via home infusion services   - For pain: Take Tylenol 1000mg every 8 hours as needed for mild pain, oxycodone 5mg every 6 hours as needed for severe pain  - pain medications can cause constipation, you should eat a high fiber diet and may take a stool softener while on pain meds   - Avoid taking Aspirin for pain as they can cause bleeding     Call the office or come to ED if:  - wound has drainage or bleeding, increased redness or pain at incision site, neurological change, fever (>101), chills, night sweats, syncope, nausea/vomiting, chest pain, shortness of breath      Playback:  - A copy of your discharge instructions will be available on [employer]    WITHIN 24 HOURS OF DISCHARGE, PLEASE CONTACT NEURO PA  WITH ANY QUESTIONS OR CONCERNS: [phone]   OTHERWISE, PLEASE CALL THE OFFICE WITH ANY QUESTIONS OR CONCERNS: [phone] Neurosurgery follow up appointment date/time: 8/13 at 1pm  - Suture in place at your incision site will be removed at your follow up appointment.   - please call the office to make/confirm appointment: [phone]    Wound Care:  - you may shower daily  starting today and get your incision wet; pat dry with clean towel after and keep incision open to air  - apply mupirocin to incision twice a day  - no picking at incision  -leave incision open to air   - do not let your glasses touch the incision    Activity:  - fatigue is common after surgery, rest if you feel tired   - no bending, lifting, twisting or heavy lifting   - walking is recommended, ambulate as tolerated  - you may shower when you get home, keep your incision dry  - no soaking in a tub/pool/hot tub   - no driving within 24 hours of anesthesia or while taking prescription pain medications   - keep hydrated, drink plenty of water     Please also follow up with your primary care doctor.     Devices:   -PICC: left upper extremity PICC line placed on 7/29.    Pain Expectations:  - pain after surgery is expected; please take pain meds as prescribed     Medications:  - Continue all home medications: Escitalopram (lexapro) 10mg once daily, Levothyroxine (synthroid) 50mcg daily  -Antibiotics: Vancomycin 1,250mg every 12 hours (until 8/8) via home infusion services. Weekly labs: CMP, CBC with diff, vanc trough faxed to ID office at [phone]  -Patient to follow up with Dr. Lucio in 2 weeks ([address] [place] [address], [phone]), ID office will call patient to schedule     - For pain: Take Tylenol 1000mg every 8 hours as needed for mild pain, do not exceed maximum daily dose 4,000mg daily.   - Avoid taking Aspirin for pain as it can cause bleeding     Call the office or come to ED if:  - wound has drainage or bleeding, increased redness or pain at incision site, neurological change, fever (>101), chills, night sweats, syncope, nausea/vomiting, chest pain, shortness of breath      Playback:  - A copy of your discharge instructions will be available on [employer]    WITHIN 24 HOURS OF DISCHARGE, PLEASE CONTACT NEURO PA  WITH ANY QUESTIONS OR CONCERNS: [phone]   OTHERWISE, PLEASE CALL THE OFFICE WITH ANY QUESTIONS OR CONCERNS: [phone] Neurosurgery follow up appointment date/time: 8/13 at 1pm  - Sutures in place at your incision site will be removed at your follow up appointment.   - please call the office to make/confirm appointment: [phone]    Wound Care:  - you may shower daily  starting today and get your incision wet; pat dry with clean towel after and keep incision open to air  - apply mupirocin to incision twice a day  - no picking at incision  -leave incision open to air   - do not let your glasses touch the incision    Activity:  - fatigue is common after surgery, rest if you feel tired   - no bending, lifting, twisting or heavy lifting   - walking is recommended, ambulate as tolerated  - you may shower when you get home, keep your incision dry  - no soaking in a tub/pool/hot tub   - no driving within 24 hours of anesthesia or while taking prescription pain medications   - keep hydrated, drink plenty of water     Please also follow up with your primary care doctor.     Devices:   -PICC: left upper extremity PICC line placed on 7/29 for IV antibiotics     Pain Expectations:  - pain after surgery is expected; please take pain meds as prescribed     Medications:  - Continue all home medications: Escitalopram (lexapro) 10mg once daily, Levothyroxine (synthroid) 50mcg daily  -Antibiotics: Vancomycin 1,250mg every 12 hours (until 8/8) via home infusion services. Weekly labs: CMP, CBC with diff, vanc trough faxed to ID office at [phone]  -Patient to follow up with Dr. Lucio in 2 weeks ([address] [place] [address], [phone]), ID office will call patient to schedule   - For pain: Take Tylenol 1000mg every 8 hours as needed for mild pain, do not exceed maximum daily dose 4,000mg daily.   - Avoid taking Aspirin for pain as it can cause bleeding     Call the office or come to ED if:  - wound has drainage or bleeding, increased redness or pain at incision site, neurological change, fever (>101), chills, night sweats, syncope, nausea/vomiting, chest pain, shortness of breath      Playback:  - A copy of your discharge instructions will be available on [employer]    WITHIN 24 HOURS OF DISCHARGE, PLEASE CONTACT NEURO PA  WITH ANY QUESTIONS OR CONCERNS: [phone]   OTHERWISE, PLEASE CALL THE OFFICE WITH ANY QUESTIONS OR CONCERNS: [phone] Neurosurgery follow up appointment date/time: 8/13 at 1pm  - Sutures in place at your incision site will be removed at your follow up appointment.   - please call the office to make/confirm appointment: [phone]    Wound Care:  - you may shower daily  starting today and get your incision wet; pat dry with clean towel after and keep incision open to air  - no creams or ointments  - no picking at incision  -leave incision open to air   - do not let your glasses touch the incision    Activity:  - fatigue is common after surgery, rest if you feel tired   - no bending, lifting, twisting or heavy lifting   - walking is recommended, ambulate as tolerated  - you may shower when you get home, keep your incision dry  - no soaking in a tub/pool/hot tub   - no driving within 24 hours of anesthesia or while taking prescription pain medications   - keep hydrated, drink plenty of water     Please also follow up with your primary care doctor.     Devices:   -PICC: left upper extremity PICC line placed on 7/29 for IV antibiotics     Pain Expectations:  - pain after surgery is expected; please take pain meds as prescribed     Medications:  - Continue all home medications: Escitalopram (lexapro) 10mg once daily, Levothyroxine (synthroid) 50mcg daily  -Antibiotics: Vancomycin 1,250mg every 12 hours (until 8/8) via home infusion services. Weekly labs: CMP, CBC with diff, vanc trough faxed to ID office at [phone]  -Patient to follow up with Dr. Lucio in 2 weeks ([address] [place] [address], [phone]), ID office will call patient to schedule   - For pain: Take Tylenol 1000mg every 8 hours as needed for mild pain, do not exceed maximum daily dose 4,000mg daily.   - Avoid taking Aspirin for pain as it can cause bleeding     Call the office or come to ED if:  - wound has drainage or bleeding, increased redness or pain at incision site, neurological change, fever (>101), chills, night sweats, syncope, nausea/vomiting, chest pain, shortness of breath      Playback:  - A copy of your discharge instructions will be available on [employer]    WITHIN 24 HOURS OF DISCHARGE, PLEASE CONTACT NEURO PA  WITH ANY QUESTIONS OR CONCERNS: [phone]   OTHERWISE, PLEASE CALL THE OFFICE WITH ANY QUESTIONS OR CONCERNS: [phone]

## 2024-07-25 NOTE — CONSULT NOTE ADULT - NS ATTEND AMEND GEN_ALL_CORE FT
Agree with above. Discussed with neurosurgery - no evidence of OM intraoperatively.  MRI wiithout evidence of OM.   Concern for superficial infection.  Cultures NGTD.  They have only been in the lab for a little over 24hrs.  Would recommend continuing IV Cefazolin for another day (until 7/26). If there is growth, we can target it with antibiotics.  If there is no growth, will recommend empiric PO antibiotics for discharge.

## 2024-07-25 NOTE — PROGRESS NOTE ADULT - ASSESSMENT
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 Mcbride's outpatient office with complaints of wound drainage from surgical site incision that has started on Friday. Patient denies headaches, nausea/vomiting, lightheadedness, dizziness, fevers, and chills. Patient denies pain to the surgical site. Admitted to to [facility] for further management.

## 2024-07-25 NOTE — DISCHARGE NOTE PROVIDER - CARE PROVIDER_API CALL
Buddy Camp  Neurosurgery  [address] [place] [address]  Phone: [phone]  Fax: [phone]  Follow Up Time:     Jordin Mcbride  Neurosurgery  [address], [address] [place] [address]  Phone: [phone]  Fax: [phone]  Follow Up Time:    Buddy Camp  Neurosurgery  [address] [place] [address]  Phone: [phone]  Fax: [phone]  Follow Up Time:     Jordin Mcbride  Neurosurgery  [address], [address] [place] [address]  Phone: [phone]  Fax: [phone]  Follow Up Time:     Victor Hugo Lucio  Infectious Disease  [address], [address]  [place] [address]  Phone: [phone]  Fax: [phone]  Follow Up Time:

## 2024-07-25 NOTE — DISCHARGE NOTE PROVIDER - HOSPITAL COURSE
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 Mcbride's outpatient office with complaints of wound drainage from surgical site incision that has started on Friday. Patient denies headaches, nausea/vomiting, lightheadedness, dizziness, fevers, and chills. Patient denies pain to the surgical site. Admitted to to [facility] for further management. Now s/p excisional debridement of wound of scalp including bone (7/23).    Hospital Course:   7/22: admit to neurosurgery for wound drainge from surgical site. MRI head w/ and w/o orderd. Infectious workup ordered (ESR/CRP/Blood cultures)   7/23: NAZIA ovn, OR today, POD 0 excisional debridement of wound of scalp including bone  7/24: POD 1, NAZIA ovn.   7/25: POD 2, NAZIA overnight. OR cxs show NGTD. ID consulted.       Hospital course uncomplicated    Exam on day of discharge:  Neurologically intact, with left retrosigmoid incision c/d/i with sutures    Patient is medically stable for discharge home    Neurosurgery follow up appointment date/time: 8/13 at 1pm  - any sutures/staples in place will be removed at your follow up appointment  - please call the office to make/confirm appointment: [phone]    Wound Care:  - you may shower starting tomorrow and get your incision wet; pat dry with clean towel after and keep incision open to air  - no picking at incision  - do not let your glasses touch the incision    Activity:  - fatigue is common after surgery, rest if you feel tired   - no bending, lifting, twisting or heavy lifting   - walking is recommended, ambulate as tolerated  - you may shower when you get home, keep your incision dry  - no soaking in a tub/pool/hot tub   - no driving within 24 hours of anesthesia or while taking prescription pain medications   - keep hydrated, drink plenty of water     Please also follow up with your primary care doctor.     Pain Expectations:  - pain after surgery is expected; please take pain meds as prescribed     Medications:  - Continue all home medications  - For pain: Take Tylenol 1000mg every 8 hours as needed for mild pain, oxycodone 5mg every 6 hours as needed for severe pain  - pain medications can cause constipation, you should eat a high fiber diet and may take a stool softener while on pain meds   - Avoid taking Aspirin for pain as they can cause bleeding     Call the office or come to ED if:  - wound has drainage or bleeding, increased redness or pain at incision site, neurological change, fever (>101), chills, night sweats, syncope, nausea/vomiting, chest pain, shortness of breath      Playback:  - A copy of your discharge instructions will be available on [employer]    WITHIN 24 HOURS OF DISCHARGE, PLEASE CONTACT NEURO PA  WITH ANY QUESTIONS OR CONCERNS: [phone]   OTHERWISE, PLEASE CALL THE OFFICE WITH ANY QUESTIONS OR CONCERNS: [phone] HPI:  58 y/o female PMH hypothyroidism, depression, s/p left craniotomy, exploration of foramen magnum, 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 Mcbride's outpatient office with complaints of wound drainage from surgical site incision that has started on Friday. Patient denies headaches, nausea/vomiting, lightheadedness, dizziness, fevers, and chills. Patient denies pain to the surgical site. Admitted to to [facility] for further management. Now s/p excisional debridement of wound of scalp including bone (7/23).    Hospital Course:   7/22: admit to neurosurgery for wound drainge from surgical site. MRI head w/ and w/o orderd. Infectious workup ordered (ESR/CRP/Blood cultures)   7/23: NAZIA ovn, OR today, POD 0 excisional debridement of wound of scalp including bone  7/24: POD 1, NAZIA ovn.   7/25: POD 2, NAZIA overnight. OR cxs show NGTD. ID consulted.       Hospital course uncomplicated    Exam on day of discharge:  Neurologically intact, with left retrosigmoid incision c/d/i with sutures    Patient is medically stable for discharge home    Neurosurgery follow up appointment date/time: 8/13 at 1pm  - any sutures/staples in place will be removed at your follow up appointment  - please call the office to make/confirm appointment: [phone]    Wound Care:  - you may shower starting tomorrow and get your incision wet; pat dry with clean towel after and keep incision open to air  - no picking at incision  - do not let your glasses touch the incision    Activity:  - fatigue is common after surgery, rest if you feel tired   - no bending, lifting, twisting or heavy lifting   - walking is recommended, ambulate as tolerated  - you may shower when you get home, keep your incision dry  - no soaking in a tub/pool/hot tub   - no driving within 24 hours of anesthesia or while taking prescription pain medications   - keep hydrated, drink plenty of water     Please also follow up with your primary care doctor.     Pain Expectations:  - pain after surgery is expected; please take pain meds as prescribed     Medications:  - Continue all home medications  - For pain: Take Tylenol 1000mg every 8 hours as needed for mild pain, oxycodone 5mg every 6 hours as needed for severe pain  - pain medications can cause constipation, you should eat a high fiber diet and may take a stool softener while on pain meds   - Avoid taking Aspirin for pain as they can cause bleeding     Call the office or come to ED if:  - wound has drainage or bleeding, increased redness or pain at incision site, neurological change, fever (>101), chills, night sweats, syncope, nausea/vomiting, chest pain, shortness of breath      Playback:  - A copy of your discharge instructions will be available on [employer]    WITHIN 24 HOURS OF DISCHARGE, PLEASE CONTACT NEURO PA  WITH ANY QUESTIONS OR CONCERNS: [phone]   OTHERWISE, PLEASE CALL THE OFFICE WITH ANY QUESTIONS OR CONCERNS: [phone] HPI:  58 y/o female PMH hypothyroidism, depression, s/p left craniotomy, exploration of foramen magnum, 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 Mcbride's outpatient office with complaints of wound drainage from surgical site incision that has started on Friday. Patient denies headaches, nausea/vomiting, lightheadedness, dizziness, fevers, and chills. Patient denies pain to the surgical site. Admitted to to [facility] for further management. Now s/p excisional debridement of wound of scalp including bone (7/23).    Hospital Course:   7/22: admit to neurosurgery for wound drainge from surgical site. MRI head w/ and w/o orderd. Infectious workup ordered (ESR/CRP/Blood cultures)   7/23: NAZIA ovn, OR today, POD 0 excisional debridement of wound of scalp including bone  7/24: POD 1, NAZIA ovn.   7/25: POD 2, NAZIA overnight. OR cxs show NGTD. ID consulted.   7/26: POD 3, NAZIA overnight. Started vancomycin 750mg q12 per ID recs. Ancef dc'd. SQL tonight. Dressing changes BID with mupirocin.  7/27: POD4, NAZIA overnight  7/28: POD5, NAZIA overnight, Vancomycin increased to 1g q12 due to subtherapeutic vanc trough.   7/29: POD 6. NAZIA overnight, neuro stable. pending PICC.     Patient is going home with home infusion services     Hospital course complicated by OR cultures growing cornyebacterium (continue IV abx vancomycin 1,000mg BID 7/26-8/4)    Exam on day of discharge:  General: NAD, pt is comfortably sitting up in bed, on room air  HEENT: PERRL 3mm briskly reactive, EOMI b/l, face symmetric, tongue midline, neck FROM  Cardiovascular: RRR, S1, S2  Respiratory: breathing non-labored on RA, chest rise symmetric  GI: abd soft, NTND   Neuro: A&Ox3, No aphasia, speech clear, no dysmetria, no pronator drift. Follows commands.  NELSON x4 spontaneously, 5/5 strength in all extremities throughout. Sensation intact  Extremities: distal pulses 2+ x4  Wound/incision: left incision w sutures and mupirocin, c/d/i       Patient is medically stable for discharge home, afebrile, vitals stable, pain controlled.    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 Mcbride's outpatient office with complaints of wound drainage from surgical site incision that has started on Friday. Patient denies headaches, nausea/vomiting, lightheadedness, dizziness, fevers, and chills. Patient denies pain to the surgical site. Admitted to to [facility] for further management. Now s/p excisional debridement of wound of scalp including bone (7/23).    Hospital Course:   7/22: admit to neurosurgery for wound drainge from surgical site. MRI head w/ and w/o orderd. Infectious workup ordered (ESR/CRP/Blood cultures)   7/23: NAZIA ovn, OR today, POD 0 excisional debridement of wound of scalp including bone  7/24: POD 1, NAZIA ovn.   7/25: POD 2, NAZIA overnight. OR cxs show NGTD. ID consulted.   7/26: POD 3, NAZIA overnight. Started vancomycin 750mg q12 per ID recs. Ancef dc'd. SQL tonight. Dressing changes BID with mupirocin.  7/27: POD4, NAZIA overnight  7/28: POD5, NAZIA overnight, Vancomycin increased to 1g q12 due to subtherapeutic vanc trough.   7/29: POD 6. NAZIA overnight, neuro stable. pending PICC.   7/31: POD8. Morning vanc trough 26.6, rpt pending. Repeat vanc trough 11.9 (new goal 10-16).   8/1: POD9, NAZIA overnight.     Patient is going home with home infusion services     Hospital course complicated by OR cultures growing cornyebacterium (continue IV abx vancomycin 1,000mg BID 7/26-8/4)    Exam on day of discharge:  General: NAD, pt is comfortably sitting up in bed, on room air  HEENT: PERRL 3mm briskly reactive, EOMI b/l, face symmetric, tongue midline, neck FROM  Cardiovascular: RRR, S1, S2  Respiratory: breathing non-labored on RA, chest rise symmetric  GI: abd soft, NTND   Neuro: A&Ox3, No aphasia, speech clear, no dysmetria, no pronator drift. Follows commands.  NELSON x4 spontaneously, 5/5 strength in all extremities throughout. Sensation intact  Extremities: distal pulses 2+ x4  Wound/incision: left incision w sutures and mupirocin, c/d/i       Patient is medically stable for discharge home, afebrile, vitals stable, pain controlled.    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 Mcbride's outpatient office with complaints of wound drainage from surgical site incision that has started on Friday. Patient denies headaches, nausea/vomiting, lightheadedness, dizziness, fevers, and chills. Patient denies pain to the surgical site. Admitted to to [facility] for further management. Now s/p excisional debridement of wound of scalp including bone (7/23).    Hospital Course:   7/22: admit to neurosurgery for wound drainge from surgical site. MRI head w/ and w/o orderd. Infectious workup ordered (ESR/CRP/Blood cultures)   7/23: NAZIA ovn, OR today, POD 0 excisional debridement of wound of scalp including bone  7/24: POD 1, NAZIA ovn.   7/25: POD 2, NAZIA overnight. OR cxs show NGTD. ID consulted.   7/26: POD 3, NAZIA overnight. Started vancomycin 750mg q12 per ID recs. Ancef dc'd. SQL tonight. Dressing changes BID with mupirocin.  7/27: POD4, NAZIA overnight  7/28: POD5, NAZIA overnight, Vancomycin increased to 1g q12 due to subtherapeutic vanc trough.   7/29: POD 6. NAZIA overnight, neuro stable. pending PICC.   7/31: POD8. Morning vanc trough 26.6, rpt pending. Repeat vanc trough 11.9 (new goal 10-16).   8/1: POD9, NAZIA overnight.     Patient is going home with home infusion services     Hospital course complicated by OR cultures growing cornyebacterium (continue IV abx vancomycin 1,250 mg BID 7/26-8/8)    Exam on day of discharge:  General: NAD, pt is comfortably sitting up in bed, on room air  HEENT: PERRL 3mm briskly reactive, EOMI b/l, face symmetric, tongue midline, neck FROM  Cardiovascular: RRR, S1, S2  Respiratory: breathing non-labored on RA, chest rise symmetric  GI: abd soft, NTND   Neuro: A&Ox3, No aphasia, speech clear, no dysmetria, no pronator drift. Follows commands.  NELSON x4 spontaneously, 5/5 strength in all extremities throughout. Sensation intact  Extremities: distal pulses 2+ x4  Wound/incision: left incision w sutures and mupirocin, c/d/i       Patient is medically stable for discharge home, afebrile, vitals stable, pain controlled.

## 2024-07-26 LAB
ANION GAP SERPL CALC-SCNC: 10 MMOL/L — SIGNIFICANT CHANGE UP (ref 5–17)
BUN SERPL-MCNC: 16 MG/DL — SIGNIFICANT CHANGE UP (ref 7–23)
CALCIUM SERPL-MCNC: 9.4 MG/DL — SIGNIFICANT CHANGE UP (ref 8.4–10.5)
CHLORIDE SERPL-SCNC: 101 MMOL/L — SIGNIFICANT CHANGE UP (ref 96–108)
CO2 SERPL-SCNC: 30 MMOL/L — SIGNIFICANT CHANGE UP (ref 22–31)
CREAT SERPL-MCNC: 0.66 MG/DL — SIGNIFICANT CHANGE UP (ref 0.5–1.3)
EGFR: 102 ML/MIN/1.73M2 — SIGNIFICANT CHANGE UP
GLUCOSE SERPL-MCNC: 98 MG/DL — SIGNIFICANT CHANGE UP (ref 70–99)
HCT VFR BLD CALC: 38.1 % — SIGNIFICANT CHANGE UP (ref 34.5–45)
HGB BLD-MCNC: 12.8 G/DL — SIGNIFICANT CHANGE UP (ref 11.5–15.5)
MAGNESIUM SERPL-MCNC: 2.1 MG/DL — SIGNIFICANT CHANGE UP (ref 1.6–2.6)
MCHC RBC-ENTMCNC: 32.2 PG — SIGNIFICANT CHANGE UP (ref 27–34)
MCHC RBC-ENTMCNC: 33.6 GM/DL — SIGNIFICANT CHANGE UP (ref 32–36)
MCV RBC AUTO: 96 FL — SIGNIFICANT CHANGE UP (ref 80–100)
NRBC # BLD: 0 /100 WBCS — SIGNIFICANT CHANGE UP (ref 0–0)
PHOSPHATE SERPL-MCNC: 4.1 MG/DL — SIGNIFICANT CHANGE UP (ref 2.5–4.5)
PLATELET # BLD AUTO: 185 K/UL — SIGNIFICANT CHANGE UP (ref 150–400)
POTASSIUM SERPL-MCNC: 4.3 MMOL/L — SIGNIFICANT CHANGE UP (ref 3.5–5.3)
POTASSIUM SERPL-SCNC: 4.3 MMOL/L — SIGNIFICANT CHANGE UP (ref 3.5–5.3)
RBC # BLD: 3.97 M/UL — SIGNIFICANT CHANGE UP (ref 3.8–5.2)
RBC # FLD: 11.9 % — SIGNIFICANT CHANGE UP (ref 10.3–14.5)
SODIUM SERPL-SCNC: 141 MMOL/L — SIGNIFICANT CHANGE UP (ref 135–145)
WBC # BLD: 4.5 K/UL — SIGNIFICANT CHANGE UP (ref 3.8–10.5)
WBC # FLD AUTO: 4.5 K/UL — SIGNIFICANT CHANGE UP (ref 3.8–10.5)

## 2024-07-26 PROCEDURE — 99231 SBSQ HOSP IP/OBS SF/LOW 25: CPT

## 2024-07-26 PROCEDURE — 99233 SBSQ HOSP IP/OBS HIGH 50: CPT

## 2024-07-26 RX ORDER — VANCOMYCIN HYDROCHLORIDE 5 G/100ML
750 INJECTION, POWDER, LYOPHILIZED, FOR SOLUTION INTRAVENOUS EVERY 12 HOURS
Refills: 0 | Status: DISCONTINUED | OUTPATIENT
Start: 2024-07-26 | End: 2024-07-28

## 2024-07-26 RX ORDER — ENOXAPARIN SODIUM 120 MG/.8ML
40 INJECTION SUBCUTANEOUS EVERY 24 HOURS
Refills: 0 | Status: DISCONTINUED | OUTPATIENT
Start: 2024-07-26 | End: 2024-08-01

## 2024-07-26 RX ADMIN — MUPIROCIN CALCIUM 1 APPLICATION(S): 20 CREAM TOPICAL at 18:53

## 2024-07-26 RX ADMIN — Medication 100 MILLIGRAM(S): at 00:17

## 2024-07-26 RX ADMIN — Medication 50 MICROGRAM(S): at 05:32

## 2024-07-26 RX ADMIN — MUPIROCIN CALCIUM 1 APPLICATION(S): 20 CREAM TOPICAL at 05:34

## 2024-07-26 RX ADMIN — ENOXAPARIN SODIUM 40 MILLIGRAM(S): 120 INJECTION SUBCUTANEOUS at 21:38

## 2024-07-26 RX ADMIN — Medication 10 MILLIGRAM(S): at 12:26

## 2024-07-26 RX ADMIN — Medication 100 MILLIGRAM(S): at 09:54

## 2024-07-26 RX ADMIN — VANCOMYCIN HYDROCHLORIDE 250 MILLIGRAM(S): 5 INJECTION, POWDER, LYOPHILIZED, FOR SOLUTION INTRAVENOUS at 14:22

## 2024-07-26 RX ADMIN — OXYCODONE HYDROCHLORIDE 5 MILLIGRAM(S): 30 TABLET ORAL at 00:29

## 2024-07-26 NOTE — PROGRESS NOTE ADULT - ASSESSMENT
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 Mcbride's outpatient office with complaints of wound drainage found to have wound dehiscence s/p Excisional debridement of wound of scalp including bone 7/23. Per NSGY, all superficial, low concern for OM. Patient is stable and well appearing- afebrile, no leukocytosis, ESR/CRP wnl. Bcxs 7/22 ngtd. OR cultures 7/23 -  Corynebacterium growing in both superficial cultures; staph epi also growing in one of the superficial cultures- likely contaminant but will also be covered by regimen. Deep culture ngtd. Corynebacterium is typically a very resistant organism with few antibiotic options- cannot use Doxycycline (tetracycline allergy) or  Linezolid (interacts with Lexapro- serotonin syndrome). Best option for patient is vancomycin- d/w patient, she is agreeable with plan and is willing to stay until therapeutic vanc trough is obtained.      Suggest:  -f/u bcxs   -f/u OR cultures   -stop cefazolin   -start vancomycin 750 mg IV Q12. Check trough prior to 4th dose   -anticipate 10 days of antibiotics from date of OR   -Ok to place midline if needed.       Team 2 will follow you.    Case d/w primary team.  Final recommendation pending attending note.    aVndana Stanford, Infectious Diseases PA  Please reach out for any questions 9 am-5pm.   For evenings and weekends, please call the ID physician on call.

## 2024-07-26 NOTE — PROGRESS NOTE ADULT - SUBJECTIVE AND OBJECTIVE BOX
O/N Events:  Subjective/ROS: Denies HA, CP, SOB, n/v, changes in bowel/urinary habits.  12pt ROS otherwise negative.    VITALS  Vital Signs Last 24 Hrs  T(C): 36.6 (26 Jul 2024 09:00), Max: 36.9 (25 Jul 2024 15:46)  T(F): 97.8 (26 Jul 2024 09:00), Max: 98.4 (25 Jul 2024 15:46)  HR: 62 (26 Jul 2024 09:00) (61 - 68)  BP: 130/82 (26 Jul 2024 09:00) (119/71 - 130/82)  BP(mean): --  RR: 18 (26 Jul 2024 09:00) (16 - 18)  SpO2: 100% (26 Jul 2024 09:00) (96% - 100%)    Parameters below as of 26 Jul 2024 09:00  Patient On (Oxygen Delivery Method): room air        I&O's Summary      CAPILLARY BLOOD GLUCOSE          PHYSICAL EXAM  Gen: Reclining in bed at time of exam, appears stated age  HEENT: MMM, clear OP; head wrapped in clean dry, kerlix   Neck: trachea at midline  CV: RRR, +S1/S2  Pulm: adequate respiratory effort, no increase in work of breathing  Abd: soft, ND  Skin: warm and dry,   Ext: no LE edema   Neuro: AOx3, speaking in full sentences  Psych: affect and behavior appropriate, pleasant at time of interview    MEDICATIONS  (STANDING):  enoxaparin Injectable 40 milliGRAM(s) SubCutaneous every 24 hours  escitalopram 10 milliGRAM(s) Oral daily  levothyroxine 50 MICROGram(s) Oral daily  loratadine 10 milliGRAM(s) Oral daily  mupirocin 2% Ointment 1 Application(s) Topical two times a day  polyethylene glycol 3350 17 Gram(s) Oral daily  scopolamine 1 mG/72 Hr(s) Patch 1 Patch Transdermal every 72 hours  senna 2 Tablet(s) Oral at bedtime  vancomycin  IVPB 750 milliGRAM(s) IV Intermittent every 12 hours    MEDICATIONS  (PRN):  acetaminophen     Tablet .. 650 milliGRAM(s) Oral every 6 hours PRN Temp greater or equal to 38C (100.4F), Mild Pain (1 - 3)  oxyCODONE    IR 5 milliGRAM(s) Oral every 4 hours PRN Moderate Pain (4 - 6)      Decadron (Flushing)  gabapentin (Rash)  strawberry (Unknown)  latex (Flushing)  tetracycline (Flushing)  cefuroxime (Flushing)  Zithromax (Flushing)      LABS                        12.8   4.50  )-----------( 185      ( 26 Jul 2024 05:30 )             38.1     07-26    141  |  101  |  16  ----------------------------<  98  4.3   |  30  |  0.66    Ca    9.4      26 Jul 2024 05:30  Phos  4.1     07-26  Mg     2.1     07-26        Urinalysis Basic - ( 26 Jul 2024 05:30 )    Color: x / Appearance: x / SG: x / pH: x  Gluc: 98 mg/dL / Ketone: x  / Bili: x / Urobili: x   Blood: x / Protein: x / Nitrite: x   Leuk Esterase: x / RBC: x / WBC x   Sq Epi: x / Non Sq Epi: x / Bacteria: x            IMAGING/EKG/ETC: reviewed

## 2024-07-26 NOTE — PROGRESS NOTE ADULT - SUBJECTIVE AND OBJECTIVE BOX
INFECTIOUS DISEASES CONSULT FOLLOW-UP NOTE    INTERVAL HPI/OVERNIGHT EVENTS:    Patient seen and examined at bedside. NAZIA. Afebrile.       ROS:   Constitutional, eyes, ENT, cardiovascular, respiratory, gastrointestinal, genitourinary, integumentary, neurological, psychiatric and heme/lymph are otherwise negative other than noted above       ANTIBIOTICS/RELEVANT:    MEDICATIONS  (STANDING):  enoxaparin Injectable 40 milliGRAM(s) SubCutaneous every 24 hours  escitalopram 10 milliGRAM(s) Oral daily  levothyroxine 50 MICROGram(s) Oral daily  loratadine 10 milliGRAM(s) Oral daily  mupirocin 2% Ointment 1 Application(s) Topical two times a day  polyethylene glycol 3350 17 Gram(s) Oral daily  scopolamine 1 mG/72 Hr(s) Patch 1 Patch Transdermal every 72 hours  senna 2 Tablet(s) Oral at bedtime  vancomycin  IVPB 750 milliGRAM(s) IV Intermittent every 12 hours    MEDICATIONS  (PRN):  acetaminophen     Tablet .. 650 milliGRAM(s) Oral every 6 hours PRN Temp greater or equal to 38C (100.4F), Mild Pain (1 - 3)  oxyCODONE    IR 5 milliGRAM(s) Oral every 4 hours PRN Moderate Pain (4 - 6)        Vital Signs Last 24 Hrs  T(C): 36.6 (26 Jul 2024 09:00), Max: 36.9 (25 Jul 2024 15:46)  T(F): 97.8 (26 Jul 2024 09:00), Max: 98.4 (25 Jul 2024 15:46)  HR: 62 (26 Jul 2024 09:00) (61 - 68)  BP: 130/82 (26 Jul 2024 09:00) (119/71 - 130/82)  BP(mean): --  RR: 18 (26 Jul 2024 09:00) (16 - 18)  SpO2: 100% (26 Jul 2024 09:00) (96% - 100%)    Parameters below as of 26 Jul 2024 09:00  Patient On (Oxygen Delivery Method): room air        PHYSICAL EXAM:  Constitutional: alert, NAD  Eyes: the sclera and conjunctiva were normal.   Head: Wound noted L base of neck. not actively draining. mild surrounding erythema   ENT: the ears and nose were normal in appearance.   Neck: the appearance of the neck was normal and the neck was supple.   Pulmonary: no respiratory distress and lungs were clear to auscultation bilaterally.   Heart: heart rate was normal and rhythm regular, normal S1 and S2  Vascular:. there was no peripheral edema  Abdomen: normal bowel sounds, soft, non-tender  Neurological: no focal deficits.   Psychiatric: the affect was normal      LABS:                        12.8   4.50  )-----------( 185      ( 26 Jul 2024 05:30 )             38.1     07-26    141  |  101  |  16  ----------------------------<  98  4.3   |  30  |  0.66    Ca    9.4      26 Jul 2024 05:30  Phos  4.1     07-26  Mg     2.1     07-26        Urinalysis Basic - ( 26 Jul 2024 05:30 )    Color: x / Appearance: x / SG: x / pH: x  Gluc: 98 mg/dL / Ketone: x  / Bili: x / Urobili: x   Blood: x / Protein: x / Nitrite: x   Leuk Esterase: x / RBC: x / WBC x   Sq Epi: x / Non Sq Epi: x / Bacteria: x        MICROBIOLOGY:    Culture - Surgical Swab (collected 07-23-24 @ 17:00)  Source: .Surgical Swab explanted mesh or spec  Preliminary Report (07-24-24 @ 17:40):    No growth    Culture - Surgical Swab (collected 07-23-24 @ 17:00)  Source: .Surgical Swab subnoccipital  wound  deep or spec  Preliminary Report (07-24-24 @ 18:11):    No growth    Culture - Surgical Swab (collected 07-23-24 @ 17:00)  Source: .Surgical Swab subnoccipital superficial wound or spec  Preliminary Report (07-25-24 @ 23:42):    Numerous Corynebacterium species "Susceptibilities not performed"    Culture - Surgical Swab (collected 07-23-24 @ 17:00)  Source: .Surgical Swab subnoccipital wound or spec  Preliminary Report (07-25-24 @ 23:39):    Rare Staphylococcus epidermidis "Susceptibilities not performed"    Few Corynebacterium bovis "Susceptibilities not performed"    Culture - Blood (collected 07-22-24 @ 18:45)  Source: .Blood Blood-Peripheral  Preliminary Report (07-26-24 @ 01:02):    No growth at 72 Hours    Culture - Blood (collected 07-22-24 @ 18:30)  Source: .Blood Blood-Peripheral  Preliminary Report (07-26-24 @ 01:02):    No growth at 72 Hours        RADIOLOGY & ADDITIONAL STUDIES:  Reviewed

## 2024-07-26 NOTE — PROGRESS NOTE ADULT - SUBJECTIVE AND OBJECTIVE BOX
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 Mcbride's outpatient office with complaints of wound drainage from surgical site incision that has started on Friday. Patient denies headaches, nausea/vomiting, lightheadedness, dizziness, fevers, and chills. Patient denies pain to the surgical site. Admitted to neurosurgery for wound drainage from surgical site incision.  (22 Jul 2024 18:27)    OVERNIGHT EVENTS: OR cxs growing staph epi and corynebacterium bovis. No complaints, pain well controlled.     Hospital Course:   7/22: admit to neurosurgery for wound drainge from surgical site. MRI head w/ and w/o orderd. Infectious workup ordered (ESR/CRP/Blood cultures)   7/23: NAZIA ovn, OR today, POD 0 excisional debridement of wound of scalp including bone  7/24: POD 1, NAZIA ovn.   7/25: POD 2, NAZIA overnight. OR cxs show NGTD. ID consulted. OR cxs growing staph epi and corynebacterium bovis    Vital Signs Last 24 Hrs  T(C): 36.6 (25 Jul 2024 20:54), Max: 36.9 (25 Jul 2024 15:46)  T(F): 97.8 (25 Jul 2024 20:54), Max: 98.4 (25 Jul 2024 15:46)  HR: 68 (25 Jul 2024 20:54) (59 - 68)  BP: 119/71 (25 Jul 2024 20:54) (119/71 - 135/77)  BP(mean): --  RR: 17 (25 Jul 2024 20:54) (16 - 17)  SpO2: 97% (25 Jul 2024 20:54) (96% - 98%)  Parameters below as of 25 Jul 2024 20:54  Patient On (Oxygen Delivery Method): room air    I&O's Summary    24 Jul 2024 07:01  -  25 Jul 2024 07:00  --------------------------------------------------------  IN: 0 mL / OUT: 1320 mL / NET: -1320 mL    PHYSICAL EXAM:  Constitutional: NAD, resting comfortably in bed.   HEENT: Pupils equal, round, reactive to light. EOMI. Face symmetric, tongue midline.  Respiratory: No respiratory distress, lungs clear to auscultation bilaterally.   Cardiovascular: RRR, S1, S2.   Gastrointestinal: Abdomen soft, non tender, nondistended.  Neurological:  AAOX3. Opens eyes. Follows commands. Speech clear.  Cranial Nerves: II-XII intact  Motor: 5/5 power in b/l UE and LE  Sensation: intact to light touch in all extremities   Pronator Drift: none  Extremities: Warm, well perfused.  Wounds: headwrap in place, c/d/i    DIET:  [] NPO  [x] Mechanical  [] Tube feeds    LABS:                        12.2   5.63  )-----------( 180      ( 25 Jul 2024 05:30 )             37.7     07-25    138  |  102  |  11  ----------------------------<  100<H>  4.1   |  28  |  0.67    Ca    9.0      25 Jul 2024 05:30  Phos  3.5     07-25  Mg     2.1     07-25    Urinalysis Basic - ( 25 Jul 2024 05:30 )  Color: x / Appearance: x / SG: x / pH: x  Gluc: 100 mg/dL / Ketone: x  / Bili: x / Urobili: x   Blood: x / Protein: x / Nitrite: x   Leuk Esterase: x / RBC: x / WBC x   Sq Epi: x / Non Sq Epi: x / Bacteria: x    CAPILLARY BLOOD GLUCOSE    Drug Levels: [] N/A  CSF Analysis: [] N/A    Allergies  Decadron (Flushing)  gabapentin (Rash)  strawberry (Unknown)  latex (Flushing)  tetracycline (Flushing)  cefuroxime (Flushing)  Zithromax (Flushing)  Intolerances    MEDICATIONS:  Antibiotics:  ceFAZolin   IVPB 2000 milliGRAM(s) IV Intermittent every 8 hours    Neuro:  acetaminophen     Tablet .. 650 milliGRAM(s) Oral every 6 hours PRN  escitalopram 10 milliGRAM(s) Oral daily  oxyCODONE    IR 5 milliGRAM(s) Oral every 4 hours PRN  scopolamine 1 mG/72 Hr(s) Patch 1 Patch Transdermal every 72 hours    Anticoagulation:    OTHER:  chlorhexidine 2% Cloths 1 Application(s) Topical <User Schedule>  levothyroxine 50 MICROGram(s) Oral daily  loratadine 10 milliGRAM(s) Oral daily  mupirocin 2% Ointment 1 Application(s) Topical two times a day  polyethylene glycol 3350 17 Gram(s) Oral daily  senna 2 Tablet(s) Oral at bedtime    IVF:    CULTURES:  Culture Results:   Rare Staphylococcus epidermidis "Susceptibilities not performed"  Few Corynebacterium bovis "Susceptibilities not performed" (07-23 @ 17:00)  Culture Results:   Numerous Corynebacterium species "Susceptibilities not performed" (07-23 @ 17:00)    RADIOLOGY & ADDITIONAL TESTS:      ASSESSMENT:  60 y/o female PMH hypothyroidism, depression, s/p left craniotomy, exploration of foramen magnum, 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 Mcbride's outpatient office with complaints of wound drainage from surgical site incision that has started on Friday. Patient denies headaches, nausea/vomiting, lightheadedness, dizziness, fevers, and chills. Patient denies pain to the surgical site. Admitted to to [facility] for further management. Now s/p excisional debridement of wound of scalp including bone (7/23).    Neuro  - neuro/vitals q4  - MRI head w/ and w/o done  - Anxiety/depression: lexapro 10mg qd    Cardiology   - SBP <160    Respiratory   - RA, IS    GI   - regular diet  - bowel regimen     Renal   - IVL, voiding    Endocrine   - a1c 5.4   - hx of hypothyroidism, c/w synthroid 50 mcg daily.     ID  - afebrile  - postop Ancef  - f/u OR cx from 7/23  - ESR/CRP negative  - blood cultures NGTD     Heme  - dvt ppx: SCDs    Dispo: regional, full code    Case discussed with Dr. Mcbride and Dr. Camp

## 2024-07-26 NOTE — PROGRESS NOTE ADULT - ASSESSMENT
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 Mcbride's outpatient office with complaints of wound drainage from surgical site incision that has started on Friday. Patient denies headaches, nausea/vomiting, lightheadedness, dizziness, fevers, and chills. Patient denies pain to the surgical site. Admitted to to [facility] for further management.

## 2024-07-27 PROCEDURE — 99232 SBSQ HOSP IP/OBS MODERATE 35: CPT

## 2024-07-27 PROCEDURE — 99233 SBSQ HOSP IP/OBS HIGH 50: CPT

## 2024-07-27 PROCEDURE — 99024 POSTOP FOLLOW-UP VISIT: CPT

## 2024-07-27 RX ADMIN — Medication 50 MICROGRAM(S): at 05:23

## 2024-07-27 RX ADMIN — Medication 10 MILLIGRAM(S): at 12:35

## 2024-07-27 RX ADMIN — VANCOMYCIN HYDROCHLORIDE 250 MILLIGRAM(S): 5 INJECTION, POWDER, LYOPHILIZED, FOR SOLUTION INTRAVENOUS at 13:43

## 2024-07-27 RX ADMIN — MUPIROCIN CALCIUM 1 APPLICATION(S): 20 CREAM TOPICAL at 06:27

## 2024-07-27 RX ADMIN — VANCOMYCIN HYDROCHLORIDE 250 MILLIGRAM(S): 5 INJECTION, POWDER, LYOPHILIZED, FOR SOLUTION INTRAVENOUS at 02:06

## 2024-07-27 RX ADMIN — ENOXAPARIN SODIUM 40 MILLIGRAM(S): 120 INJECTION SUBCUTANEOUS at 21:14

## 2024-07-27 NOTE — PROGRESS NOTE ADULT - SUBJECTIVE AND OBJECTIVE BOX
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 Mcbride's outpatient office with complaints of wound drainage from surgical site incision that has started on Friday. Patient denies headaches, nausea/vomiting, lightheadedness, dizziness, fevers, and chills. Patient denies pain to the surgical site. Admitted to neurosurgery for wound drainage from surgical site incision.  (2024 18:27)      Subjective:  Pain controlled. NAZIA overnight.     Hospital course:  : admit to neurosurgery for wound drainge from surgical site. MRI head w/ and w/o orderd. Infectious workup ordered (ESR/CRP/Blood cultures)   : NAZIA ovn, OR today, POD 0 excisional debridement of wound of scalp including bone  : POD 1, NAZIA ovn.   : POD 2, NAZIA overnight. OR cxs show NGTD. ID consulted. OR cxs growing staph epi and corynebacterium bovis  : POD 3, NAZIA overnight. Started vancomycin 750mg q12 per ID recs. Ancef dc'd. SQL tonight. Dressing changes BID with mupirocin.  : POD4, NAZIA overnight    Vital Signs Last 24 Hrs  T(C): 36.4 (2024 20:49), Max: 36.8 (2024 16:06)  T(F): 97.6 (2024 20:49), Max: 98.3 (2024 16:06)  HR: 71 (2024 20:49) (61 - 80)  BP: 147/83 (2024 20:49) (124/69 - 147/83)  BP(mean): --  RR: 16 (2024 20:49) (16 - 18)  SpO2: 98% (2024 20:49) (98% - 100%)    Parameters below as of 2024 16:06  Patient On (Oxygen Delivery Method): room air        I&O's Summary      PHYSICAL EXAM:  General: patient seen laying supine in bed in NAD  Neuro: AAOx3, follows commands, opens eyes spontaneously, speech clear and fluent, CNII-XI grossly intact, face symmetric, no pronator drift,  strength 5/5 b/l upper extremities and lower extremities, sensation intact to light touch throughout  HEENT: PERRL, EOMI  Neck: supple  Cardiac: RRR, S1S2  Pulmonary: chest rise symmetric  Abdomen: soft, nontender, nondistended  Ext: perfusing well  Skin: warm, dry  Wound: L far lateral crani incision dressing c/d/i      LABS:                        12.8   4.50  )-----------( 185      ( 2024 05:30 )             38.1     -    141  |  101  |  16  ----------------------------<  98  4.3   |  30  |  0.66    Ca    9.4      2024 05:30  Phos  4.1       Mg     2.1             Urinalysis Basic - ( 2024 05:30 )    Color: x / Appearance: x / SG: x / pH: x  Gluc: 98 mg/dL / Ketone: x  / Bili: x / Urobili: x   Blood: x / Protein: x / Nitrite: x   Leuk Esterase: x / RBC: x / WBC x   Sq Epi: x / Non Sq Epi: x / Bacteria: x          CAPILLARY BLOOD GLUCOSE          Drug Levels: [] N/A    CSF Analysis: [] N/A      Allergies    Decadron (Flushing)  gabapentin (Rash)  strawberry (Unknown)  latex (Flushing)  tetracycline (Flushing)  cefuroxime (Flushing)  Zithromax (Flushing)    Intolerances      MEDICATIONS:  Antibiotics:  vancomycin  IVPB 750 milliGRAM(s) IV Intermittent every 12 hours    Neuro:  acetaminophen     Tablet .. 650 milliGRAM(s) Oral every 6 hours PRN  escitalopram 10 milliGRAM(s) Oral daily  oxyCODONE    IR 5 milliGRAM(s) Oral every 4 hours PRN  scopolamine 1 mG/72 Hr(s) Patch 1 Patch Transdermal every 72 hours    Anticoagulation:  enoxaparin Injectable 40 milliGRAM(s) SubCutaneous every 24 hours    OTHER:  levothyroxine 50 MICROGram(s) Oral daily  loratadine 10 milliGRAM(s) Oral daily  mupirocin 2% Ointment 1 Application(s) Topical two times a day  polyethylene glycol 3350 17 Gram(s) Oral daily  senna 2 Tablet(s) Oral at bedtime    IVF:    CULTURES:  Culture Results:   Rare Staphylococcus epidermidis "Susceptibilities not performed"  Few Corynebacterium bovis "Susceptibilities not performed" ( @ 17:00)  Culture Results:   Numerous Corynebacterium species "Susceptibilities not performed" ( @ 17:00)    RADIOLOGY & ADDITIONAL TESTS:    POST-OPERATIVECOMPLICATION    Encounter for follow-up examination after completed treatment for conditions other than malignant neoplasm    Encounter for other specified aftercare    Handoff    MEWS Score    MVP (mitral valve prolapse)    Atypical facial pain    Eczema    H/O headache    Osteoarthritis    H/O bone cyst    Hypothyroidism    Endometriosis    Wound dehiscence    Wound dehiscence    Excisional debridement of wound of scalp including bone    Post-operative complication    Drainage from wound    Drainage from wound    Hypothyroidism    MVP (mitral valve prolapse)    Pre-op exam    Anxiety and depression    Wound dehiscence    Excisional debridement of wound of scalp including bone    H/O myomectomy    Atypical facial pain    H/O eczema    H/O headache    H/O: osteoarthritis    H/O bone cyst    H/O carpal tunnel repair     product of IVF pregnancy    WOUND CHECK    31    Hypothyroidism    Anxiety and depression    [email or web address]        ASSESSMENT:  58 y/o female PMH hypothyroidism, depression, s/p left craniotomy, exploration of foramen magnum, 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 Mcbride's outpatient office with complaints of wound drainage from surgical site incision that has started on Friday. Patient denies headaches, nausea/vomiting, lightheadedness, dizziness, fevers, and chills. Patient denies pain to the surgical site. Admitted to to [facility] for further management. Now s/p excisional debridement of wound of scalp including bone ().    Neuro  - neuro/vitals q4  - MRI head w/ and w/o done  - Anxiety/depression: lexapro 10mg qd    Cardiology   - SBP <160    Respiratory   - RA, IS    GI   - regular diet  - bowel regimen, last BM     Renal   - IVL, voiding    Endocrine   - a1c 5.4   - hx of hypothyroidism, c/w synthroid 50 mcg daily.     ID  - afebrile  - Vancomycin 750 BID (-)  - OR cx from : growing cornyebacterium, staph epi  - ESR/CRP negative  - blood cultures NGTD     Heme  - dvt ppx: SCDs, SQL    Dispo: regional, full code    Case discussed with Dr. Mcbride and Dr. Camp        Assessment:  Present when checked    []  GCS  E   V  M     Heart Failure: []Acute, [] acute on chronic , []chronic  Heart Failure:  [] Diastolic (HFpEF), [] Systolic (HFrEF), []Combined (HFpEF and HFrEF), [] RHF, [] Pulm HTN, [] Other    [] JOHN, [] ATN, [] AIN, [] other  [] CKD1, [] CKD2, [] CKD 3, [] CKD 4, [] CKD 5, []ESRD    Encephalopathy: [] Metabolic, [] Hepatic, [] toxic, [] Neurological, [] Other    Abnormal Nurtitional Status: [] malnurtition (see nutrition note), [ ]underweight: BMI < 19, [] morbid obesity: BMI >40, [] Cachexia    [] Sepsis  [] hypovolemic shock,[] cardiogenic shock, [] hemorrhagic shock, [] neuogenic shock  [] Acute Respiratory Failure  []Cerebral edema, [] Brain compression/ herniation,   [] Functional quadriplegia  [] Acute blood loss anemia

## 2024-07-27 NOTE — PROGRESS NOTE ADULT - SUBJECTIVE AND OBJECTIVE BOX
O/N Events: NAZIA  Subjective/ROS: No complaints. Denies HA, CP, SOB, n/v, changes in bowel/urinary habits.  12pt ROS otherwise negative.    VITALS  Vital Signs Last 24 Hrs  T(C): 36.4 (27 Jul 2024 09:31), Max: 36.9 (27 Jul 2024 04:57)  T(F): 97.6 (27 Jul 2024 09:31), Max: 98.4 (27 Jul 2024 04:57)  HR: 68 (27 Jul 2024 09:31) (59 - 80)  BP: 123/74 (27 Jul 2024 09:31) (123/74 - 150/81)  BP(mean): --  RR: 18 (27 Jul 2024 09:31) (16 - 18)  SpO2: 99% (27 Jul 2024 09:31) (98% - 99%)    Parameters below as of 27 Jul 2024 09:31  Patient On (Oxygen Delivery Method): room air        I&O's Summary      CAPILLARY BLOOD GLUCOSE          PHYSICAL EXAM  Gen: Reclining in bed at time of exam, appears stated age  HEENT: MMM, clear OP  Neck: trachea at midline  CV: RRR, +S1/S2  Pulm: adequate respiratory effort, no increase in work of breathing  Abd: soft, ND  Skin: warm and dry,   Ext: no LE edema   Neuro: AOx3, speaking in full sentences  Psych: affect and behavior appropriate, pleasant at time of interview      MEDICATIONS  (STANDING):  enoxaparin Injectable 40 milliGRAM(s) SubCutaneous every 24 hours  escitalopram 10 milliGRAM(s) Oral daily  levothyroxine 50 MICROGram(s) Oral daily  loratadine 10 milliGRAM(s) Oral daily  mupirocin 2% Ointment 1 Application(s) Topical two times a day  polyethylene glycol 3350 17 Gram(s) Oral daily  scopolamine 1 mG/72 Hr(s) Patch 1 Patch Transdermal every 72 hours  senna 2 Tablet(s) Oral at bedtime  vancomycin  IVPB 750 milliGRAM(s) IV Intermittent every 12 hours    MEDICATIONS  (PRN):  acetaminophen     Tablet .. 650 milliGRAM(s) Oral every 6 hours PRN Temp greater or equal to 38C (100.4F), Mild Pain (1 - 3)  oxyCODONE    IR 5 milliGRAM(s) Oral every 4 hours PRN Moderate Pain (4 - 6)      Decadron (Flushing)  gabapentin (Rash)  strawberry (Unknown)  latex (Flushing)  tetracycline (Flushing)  cefuroxime (Flushing)  Zithromax (Flushing)      LABS                        12.8   4.50  )-----------( 185      ( 26 Jul 2024 05:30 )             38.1     07-26    141  |  101  |  16  ----------------------------<  98  4.3   |  30  |  0.66    Ca    9.4      26 Jul 2024 05:30  Phos  4.1     07-26  Mg     2.1     07-26        Urinalysis Basic - ( 26 Jul 2024 05:30 )    Color: x / Appearance: x / SG: x / pH: x  Gluc: 98 mg/dL / Ketone: x  / Bili: x / Urobili: x   Blood: x / Protein: x / Nitrite: x   Leuk Esterase: x / RBC: x / WBC x   Sq Epi: x / Non Sq Epi: x / Bacteria: x            IMAGING/EKG/ETC: reviewed

## 2024-07-27 NOTE — PROGRESS NOTE ADULT - ASSESSMENT
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 Mcbride's outpatient office with complaints of wound drainage found to have wound dehiscence s/p Excisional debridement of wound of scalp including bone 7/23. Per NSGY, all superficial, low concern for OM. Patient is stable and well appearing- afebrile, no leukocytosis, ESR/CRP wnl. Bcxs 7/22 ngtd. OR cultures 7/23 -  Corynebacterium growing in both superficial cultures; staph epi also growing in one of the superficial cultures- likely contaminant but will also be covered by regimen. Deep culture ngtd. Corynebacterium is typically a very resistant organism with few antibiotic options- cannot use Doxycycline (tetracycline allergy) or  Linezolid (interacts with Lexapro- serotonin syndrome). Best option for patient is vancomycin- d/w patient, she is agreeable with plan and is willing to stay until therapeutic vanc trough is obtained.      Recommend:  1.  Continue Vancomycin 750 mg IV q12. Check trough around midnight tonight (goal 12-18)  2.  Will need 10 day course of vancomycin     ID team 2 will follow

## 2024-07-27 NOTE — PROGRESS NOTE ADULT - SUBJECTIVE AND OBJECTIVE BOX
INTERVAL HPI/OVERNIGHT EVENTS:    Patient was seen and examined at bedside.  Tolerating vancomycin well.     CONSTITUTIONAL:  Negative fever or chills, feels well, good appetite  EYES:  Negative  blurry vision or double vision  CARDIOVASCULAR:  Negative for chest pain or palpitations  RESPIRATORY:  Negative for cough, wheezing, or SOB   GASTROINTESTINAL:  Negative for nausea, vomiting, diarrhea, constipation, or abdominal pain  GENITOURINARY:  Negative frequency, urgency or dysuria  NEUROLOGIC:  No headache, confusion, dizziness, lightheadedness      ANTIBIOTICS/RELEVANT:    MEDICATIONS  (STANDING):  enoxaparin Injectable 40 milliGRAM(s) SubCutaneous every 24 hours  escitalopram 10 milliGRAM(s) Oral daily  levothyroxine 50 MICROGram(s) Oral daily  loratadine 10 milliGRAM(s) Oral daily  mupirocin 2% Ointment 1 Application(s) Topical two times a day  polyethylene glycol 3350 17 Gram(s) Oral daily  scopolamine 1 mG/72 Hr(s) Patch 1 Patch Transdermal every 72 hours  senna 2 Tablet(s) Oral at bedtime  vancomycin  IVPB 750 milliGRAM(s) IV Intermittent every 12 hours    MEDICATIONS  (PRN):  acetaminophen     Tablet .. 650 milliGRAM(s) Oral every 6 hours PRN Temp greater or equal to 38C (100.4F), Mild Pain (1 - 3)  oxyCODONE    IR 5 milliGRAM(s) Oral every 4 hours PRN Moderate Pain (4 - 6)        Vital Signs Last 24 Hrs  T(C): 36.4 (27 Jul 2024 09:31), Max: 36.9 (27 Jul 2024 04:57)  T(F): 97.6 (27 Jul 2024 09:31), Max: 98.4 (27 Jul 2024 04:57)  HR: 68 (27 Jul 2024 09:31) (59 - 80)  BP: 123/74 (27 Jul 2024 09:31) (123/74 - 150/81)  BP(mean): --  RR: 18 (27 Jul 2024 09:31) (16 - 18)  SpO2: 99% (27 Jul 2024 09:31) (98% - 99%)    Parameters below as of 27 Jul 2024 09:31  Patient On (Oxygen Delivery Method): room air        PHYSICAL EXAM:  Constitutional: non-toxic, no distress  Eyes:BROOKS, EOMI  Ear/Nose/Throat: no oral lesion, no sinus tenderness on percussion	  Neck:  left posterior next with wound, no active drainage, mild surrounding erythema   Respiratory: CTA markus  Cardiovascular: S1S2 RRR, no murmurs  Gastrointestinal:soft, (+) BS, no HSM  Extremities:no e/e/c  Vascular: DP Pulse:	right normal; left normal      LABS:                        12.8   4.50  )-----------( 185      ( 26 Jul 2024 05:30 )             38.1     07-26    141  |  101  |  16  ----------------------------<  98  4.3   |  30  |  0.66    Ca    9.4      26 Jul 2024 05:30  Phos  4.1     07-26  Mg     2.1     07-26        Urinalysis Basic - ( 26 Jul 2024 05:30 )    Color: x / Appearance: x / SG: x / pH: x  Gluc: 98 mg/dL / Ketone: x  / Bili: x / Urobili: x   Blood: x / Protein: x / Nitrite: x   Leuk Esterase: x / RBC: x / WBC x   Sq Epi: x / Non Sq Epi: x / Bacteria: x        MICROBIOLOGY:    Culture - Surgical Swab (07.23.24 @ 17:00)    Specimen Source: .Surgical Swab explanted mesh or spec   Culture Results:   No growth    Culture - Surgical Swab (07.23.24 @ 17:00)    Specimen Source: .Surgical Swab subnoccipital  wound  deep or spec   Culture Results:   No growth      Culture - Surgical Swab (07.23.24 @ 17:00)    Specimen Source: .Surgical Swab subnoccipital superficial wound or spec   Culture Results:   Numerous Corynebacterium species "Susceptibilities not performed"      RADIOLOGY & ADDITIONAL STUDIES:

## 2024-07-27 NOTE — PROGRESS NOTE ADULT - ASSESSMENT
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 Mcbride's outpatient office with complaints of wound drainage from surgical site incision that has started on Friday. Patient denies headaches, nausea/vomiting, lightheadedness, dizziness, fevers, and chills. Patient denies pain to the surgical site. Admitted to to [facility] for further management.

## 2024-07-28 LAB
ANION GAP SERPL CALC-SCNC: 9 MMOL/L — SIGNIFICANT CHANGE UP (ref 5–17)
BUN SERPL-MCNC: 20 MG/DL — SIGNIFICANT CHANGE UP (ref 7–23)
CALCIUM SERPL-MCNC: 9.3 MG/DL — SIGNIFICANT CHANGE UP (ref 8.4–10.5)
CHLORIDE SERPL-SCNC: 103 MMOL/L — SIGNIFICANT CHANGE UP (ref 96–108)
CO2 SERPL-SCNC: 28 MMOL/L — SIGNIFICANT CHANGE UP (ref 22–31)
CREAT SERPL-MCNC: 0.72 MG/DL — SIGNIFICANT CHANGE UP (ref 0.5–1.3)
CULTURE RESULTS: ABNORMAL
CULTURE RESULTS: SIGNIFICANT CHANGE UP
EGFR: 97 ML/MIN/1.73M2 — SIGNIFICANT CHANGE UP
GLUCOSE SERPL-MCNC: 103 MG/DL — HIGH (ref 70–99)
HCT VFR BLD CALC: 36.7 % — SIGNIFICANT CHANGE UP (ref 34.5–45)
HGB BLD-MCNC: 12.7 G/DL — SIGNIFICANT CHANGE UP (ref 11.5–15.5)
MAGNESIUM SERPL-MCNC: 2.1 MG/DL — SIGNIFICANT CHANGE UP (ref 1.6–2.6)
MCHC RBC-ENTMCNC: 32.3 PG — SIGNIFICANT CHANGE UP (ref 27–34)
MCHC RBC-ENTMCNC: 34.6 GM/DL — SIGNIFICANT CHANGE UP (ref 32–36)
MCV RBC AUTO: 93.4 FL — SIGNIFICANT CHANGE UP (ref 80–100)
NRBC # BLD: 0 /100 WBCS — SIGNIFICANT CHANGE UP (ref 0–0)
PHOSPHATE SERPL-MCNC: 4.8 MG/DL — HIGH (ref 2.5–4.5)
PLATELET # BLD AUTO: 191 K/UL — SIGNIFICANT CHANGE UP (ref 150–400)
POTASSIUM SERPL-MCNC: 4.2 MMOL/L — SIGNIFICANT CHANGE UP (ref 3.5–5.3)
POTASSIUM SERPL-SCNC: 4.2 MMOL/L — SIGNIFICANT CHANGE UP (ref 3.5–5.3)
RBC # BLD: 3.93 M/UL — SIGNIFICANT CHANGE UP (ref 3.8–5.2)
RBC # FLD: 11.9 % — SIGNIFICANT CHANGE UP (ref 10.3–14.5)
SODIUM SERPL-SCNC: 140 MMOL/L — SIGNIFICANT CHANGE UP (ref 135–145)
SPECIMEN SOURCE: SIGNIFICANT CHANGE UP
VANCOMYCIN TROUGH SERPL-MCNC: 4 UG/ML — LOW (ref 10–20)
WBC # BLD: 5.96 K/UL — SIGNIFICANT CHANGE UP (ref 3.8–10.5)
WBC # FLD AUTO: 5.96 K/UL — SIGNIFICANT CHANGE UP (ref 3.8–10.5)

## 2024-07-28 PROCEDURE — 99024 POSTOP FOLLOW-UP VISIT: CPT

## 2024-07-28 PROCEDURE — 99232 SBSQ HOSP IP/OBS MODERATE 35: CPT

## 2024-07-28 PROCEDURE — 99233 SBSQ HOSP IP/OBS HIGH 50: CPT

## 2024-07-28 RX ORDER — VANCOMYCIN HYDROCHLORIDE 5 G/100ML
1000 INJECTION, POWDER, LYOPHILIZED, FOR SOLUTION INTRAVENOUS EVERY 12 HOURS
Refills: 0 | Status: DISCONTINUED | OUTPATIENT
Start: 2024-07-28 | End: 2024-07-29

## 2024-07-28 RX ADMIN — VANCOMYCIN HYDROCHLORIDE 250 MILLIGRAM(S): 5 INJECTION, POWDER, LYOPHILIZED, FOR SOLUTION INTRAVENOUS at 18:03

## 2024-07-28 RX ADMIN — MUPIROCIN CALCIUM 1 APPLICATION(S): 20 CREAM TOPICAL at 05:48

## 2024-07-28 RX ADMIN — VANCOMYCIN HYDROCHLORIDE 250 MILLIGRAM(S): 5 INJECTION, POWDER, LYOPHILIZED, FOR SOLUTION INTRAVENOUS at 02:50

## 2024-07-28 RX ADMIN — Medication 50 MICROGRAM(S): at 05:48

## 2024-07-28 RX ADMIN — ENOXAPARIN SODIUM 40 MILLIGRAM(S): 120 INJECTION SUBCUTANEOUS at 21:06

## 2024-07-28 RX ADMIN — MUPIROCIN CALCIUM 1 APPLICATION(S): 20 CREAM TOPICAL at 18:03

## 2024-07-28 RX ADMIN — Medication 650 MILLIGRAM(S): at 22:07

## 2024-07-28 RX ADMIN — Medication 10 MILLIGRAM(S): at 11:51

## 2024-07-28 RX ADMIN — Medication 650 MILLIGRAM(S): at 21:07

## 2024-07-28 NOTE — PROGRESS NOTE ADULT - ASSESSMENT
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 Mcbride's outpatient office with complaints of wound drainage found to have wound dehiscence s/p Excisional debridement of wound of scalp including bone 7/23. Per NSGY, all superficial, low concern for OM. Patient is stable and well appearing- afebrile, no leukocytosis, ESR/CRP wnl. Bcxs 7/22 ngtd. OR cultures 7/23 -  Corynebacterium growing in both superficial cultures; staph epi also growing in one of the superficial cultures- likely contaminant but will also be covered by regimen. Deep culture ngtd. Corynebacterium is typically a very resistant organism with few antibiotic options- cannot use Doxycycline (tetracycline allergy) or  Linezolid (interacts with Lexapro- serotonin syndrome). Best option for patient is vancomycin- d/w patient, she is agreeable with plan and is willing to stay until therapeutic vanc trough is obtained.      Recommend:  1.  Continue Vancomycin 1 gram IV q12.  Check trough before 4th dose  2.  Will need 10 day course of vancomycin   3.  Given low trough and CrCl > 50, we can NOT make her vancomycin dose once daily for patient convenience.     ID team 2 will follow

## 2024-07-28 NOTE — PROGRESS NOTE ADULT - ASSESSMENT
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 Mcbride's outpatient office with complaints of wound drainage from surgical site incision that has started on Friday. Patient denies headaches, nausea/vomiting, lightheadedness, dizziness, fevers, and chills. Patient denies pain to the surgical site. Admitted to to [facility] for further management.

## 2024-07-28 NOTE — PROGRESS NOTE ADULT - SUBJECTIVE AND OBJECTIVE BOX
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 Mcbride's outpatient office with complaints of wound drainage from surgical site incision that has started on Friday. Patient denies headaches, nausea/vomiting, lightheadedness, dizziness, fevers, and chills. Patient denies pain to the surgical site. Admitted to neurosurgery for wound drainage from surgical site incision.  (2024 18:27)      Subjective:  Patient denies any acute complaints. Pain controlled.    Hospital course:  : admit to neurosurgery for wound drainge from surgical site. MRI head w/ and w/o orderd. Infectious workup ordered (ESR/CRP/Blood cultures)   : NAZIA ovn, OR today, POD 0 excisional debridement of wound of scalp including bone  : POD 1, NAZIA ovn.   : POD 2, NAZIA overnight. OR cxs show NGTD. ID consulted. OR cxs growing staph epi and corynebacterium bovis  : POD 3, NAZIA overnight. Started vancomycin 750mg q12 per ID recs. Ancef dc'd. SQL tonight. Dressing changes BID with mupirocin.  : POD4, NAZIA overnight  : POD5, NAZIA overnight        Vital Signs Last 24 Hrs  T(C): 36.4 (2024 20:51), Max: 36.9 (2024 04:57)  T(F): 97.6 (2024 20:51), Max: 98.4 (2024 04:57)  HR: 66 (2024 20:51) (59 - 75)  BP: 131/82 (2024 20:51) (123/74 - 150/81)  BP(mean): --  RR: 16 (2024 20:51) (16 - 18)  SpO2: 100% (2024 20:51) (98% - 100%)    Parameters below as of 2024 20:51  Patient On (Oxygen Delivery Method): room air        I&O's Summary      PHYSICAL EXAM:  General: patient seen laying supine in bed in NAD  Neuro: AAOx3, follows commands, opens eyes spontaneously, speech clear and fluent, CNII-XI grossly intact, face symmetric, no pronator drift,  strength 5/5 b/l upper extremities and lower extremities, sensation intact to light touch throughout  HEENT: PERRL, EOMI  Neck: supple  Cardiac: RRR, S1S2  Pulmonary: chest rise symmetric  Abdomen: soft, nontender, nondistended  Ext: perfusing well  Skin: warm, dry  Wound: L far lateral crani incision with xeroform in place c/d/i    LABS:                        12.8   4.50  )-----------( 185      ( 2024 05:30 )             38.1     07-    141  |  101  |  16  ----------------------------<  98  4.3   |  30  |  0.66    Ca    9.4      2024 05:30  Phos  4.1     -  Mg     2.1     -        Urinalysis Basic - ( 2024 05:30 )    Color: x / Appearance: x / SG: x / pH: x  Gluc: 98 mg/dL / Ketone: x  / Bili: x / Urobili: x   Blood: x / Protein: x / Nitrite: x   Leuk Esterase: x / RBC: x / WBC x   Sq Epi: x / Non Sq Epi: x / Bacteria: x          CAPILLARY BLOOD GLUCOSE          Drug Levels: [] N/A    CSF Analysis: [] N/A      Allergies    Decadron (Flushing)  gabapentin (Rash)  strawberry (Unknown)  latex (Flushing)  tetracycline (Flushing)  cefuroxime (Flushing)  Zithromax (Flushing)    Intolerances      MEDICATIONS:  Antibiotics:  vancomycin  IVPB 750 milliGRAM(s) IV Intermittent every 12 hours    Neuro:  acetaminophen     Tablet .. 650 milliGRAM(s) Oral every 6 hours PRN  escitalopram 10 milliGRAM(s) Oral daily  oxyCODONE    IR 5 milliGRAM(s) Oral every 4 hours PRN  scopolamine 1 mG/72 Hr(s) Patch 1 Patch Transdermal every 72 hours    Anticoagulation:  enoxaparin Injectable 40 milliGRAM(s) SubCutaneous every 24 hours    OTHER:  levothyroxine 50 MICROGram(s) Oral daily  loratadine 10 milliGRAM(s) Oral daily  mupirocin 2% Ointment 1 Application(s) Topical two times a day  polyethylene glycol 3350 17 Gram(s) Oral daily  senna 2 Tablet(s) Oral at bedtime    IVF:    CULTURES:  Culture Results:   Rare Staphylococcus epidermidis "Susceptibilities not performed"  Few Corynebacterium bovis "Susceptibilities not performed" ( @ 17:00)  Culture Results:   Numerous Corynebacterium species "Susceptibilities not performed" ( @ 17:00)    RADIOLOGY & ADDITIONAL TESTS:    POST-OPERATIVECOMPLICATION    Encounter for follow-up examination after completed treatment for conditions other than malignant neoplasm    Encounter for other specified aftercare    Handoff    MEWS Score    MVP (mitral valve prolapse)    Atypical facial pain    Eczema    H/O headache    Osteoarthritis    H/O bone cyst    Hypothyroidism    Endometriosis    Wound dehiscence    Wound dehiscence    Excisional debridement of wound of scalp including bone    Post-operative complication    Drainage from wound    Drainage from wound    Hypothyroidism    MVP (mitral valve prolapse)    Pre-op exam    Anxiety and depression    Wound dehiscence    Excisional debridement of wound of scalp including bone    H/O myomectomy    Atypical facial pain    H/O eczema    H/O headache    H/O: osteoarthritis    H/O bone cyst    H/O carpal tunnel repair    [place] product of IVF pregnancy    WOUND CHECK    31    Hypothyroidism    Anxiety and depression    [email or web address]        ASSESSMENT:  60 y/o female PMH hypothyroidism, depression, s/p left craniotomy, exploration of foramen magnum, 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 Mcbride's outpatient office with complaints of wound drainage from surgical site incision that has started on Friday. Patient denies headaches, nausea/vomiting, lightheadedness, dizziness, fevers, and chills. Patient denies pain to the surgical site. Admitted to to [facility] for further management. Now s/p excisional debridement of wound of scalp including bone ().    Neuro  - neuro/vitals q4  - MRI head w/ and w/o done  - Anxiety/depression: lexapro 10mg qd    Cardiology   - SBP <160    Respiratory   - RA, IS    GI   - regular diet  - bowel regimen, last BM     Renal   - IVL, voiding    Endocrine   - a1c 5.4   - hx of hypothyroidism, c/w synthroid 50 mcg daily.     ID  - afebrile  - Vancomycin 750 BID (-)  - OR cx from : growing cornyebacterium, staph epi  - ESR/CRP negative  - blood cultures NGTD     Heme  - dvt ppx: SCDs, SQL    Misc  - mupirocin and xeroform dressing changes BID    Dispo: regional, full code    Case discussed with Dr. Mcbride and Dr. Camp        Assessment:  Present when checked    []  GCS  E   V  M     Heart Failure: []Acute, [] acute on chronic , []chronic  Heart Failure:  [] Diastolic (HFpEF), [] Systolic (HFrEF), []Combined (HFpEF and HFrEF), [] RHF, [] Pulm HTN, [] Other    [] JOHN, [] ATN, [] AIN, [] other  [] CKD1, [] CKD2, [] CKD 3, [] CKD 4, [] CKD 5, []ESRD    Encephalopathy: [] Metabolic, [] Hepatic, [] toxic, [] Neurological, [] Other    Abnormal Nurtitional Status: [] malnurtition (see nutrition note), [ ]underweight: BMI < 19, [] morbid obesity: BMI >40, [] Cachexia    [] Sepsis  [] hypovolemic shock,[] cardiogenic shock, [] hemorrhagic shock, [] neuogenic shock  [] Acute Respiratory Failure  []Cerebral edema, [] Brain compression/ herniation,   [] Functional quadriplegia  [] Acute blood loss anemia

## 2024-07-28 NOTE — PROGRESS NOTE ADULT - SUBJECTIVE AND OBJECTIVE BOX
O/N Events: NAZIA  Subjective/ROS: No complaints. Denies HA, CP, SOB, n/v, changes in bowel/urinary habits.  12pt ROS otherwise negative.    VITALS  Vital Signs Last 24 Hrs  T(C): 36.6 (28 Jul 2024 08:41), Max: 36.7 (27 Jul 2024 16:03)  T(F): 97.9 (28 Jul 2024 08:41), Max: 98 (27 Jul 2024 16:03)  HR: 65 (28 Jul 2024 08:41) (63 - 75)  BP: 120/76 (28 Jul 2024 08:41) (120/72 - 131/82)  BP(mean): --  RR: 18 (28 Jul 2024 08:41) (16 - 18)  SpO2: 100% (28 Jul 2024 08:41) (97% - 100%)    Parameters below as of 28 Jul 2024 08:41  Patient On (Oxygen Delivery Method): room air    I&O's Summary    CAPILLARY BLOOD GLUCOSE    PHYSICAL EXAM  Gen: Reclining in bed at time of exam, appears stated age  HEENT: MMM, clear OP  Neck: trachea at midline  CV: RRR, +S1/S2  Pulm: adequate respiratory effort, no increase in work of breathing  Abd: soft, ND  Skin: warm and dry,   Ext: no LE edema   Neuro: AOx3, speaking in full sentences  Psych: affect and behavior appropriate, pleasant at time of interview    MEDICATIONS  (STANDING):  enoxaparin Injectable 40 milliGRAM(s) SubCutaneous every 24 hours  escitalopram 10 milliGRAM(s) Oral daily  levothyroxine 50 MICROGram(s) Oral daily  loratadine 10 milliGRAM(s) Oral daily  mupirocin 2% Ointment 1 Application(s) Topical two times a day  polyethylene glycol 3350 17 Gram(s) Oral daily  scopolamine 1 mG/72 Hr(s) Patch 1 Patch Transdermal every 72 hours  senna 2 Tablet(s) Oral at bedtime  vancomycin  IVPB 1000 milliGRAM(s) IV Intermittent every 12 hours    MEDICATIONS  (PRN):  acetaminophen     Tablet .. 650 milliGRAM(s) Oral every 6 hours PRN Temp greater or equal to 38C (100.4F), Mild Pain (1 - 3)  oxyCODONE    IR 5 milliGRAM(s) Oral every 4 hours PRN Moderate Pain (4 - 6)      Decadron (Flushing)  gabapentin (Rash)  strawberry (Unknown)  latex (Flushing)  tetracycline (Flushing)  cefuroxime (Flushing)  Zithromax (Flushing)      LABS                        12.7   5.96  )-----------( 191      ( 28 Jul 2024 00:56 )             36.7     07-28    140  |  103  |  20  ----------------------------<  103<H>  4.2   |  28  |  0.72    Ca    9.3      28 Jul 2024 00:56  Phos  4.8     07-28  Mg     2.1     07-28        Urinalysis Basic - ( 28 Jul 2024 00:56 )    Color: x / Appearance: x / SG: x / pH: x  Gluc: 103 mg/dL / Ketone: x  / Bili: x / Urobili: x   Blood: x / Protein: x / Nitrite: x   Leuk Esterase: x / RBC: x / WBC x   Sq Epi: x / Non Sq Epi: x / Bacteria: x            IMAGING/EKG/ETC: reviewed

## 2024-07-28 NOTE — PROGRESS NOTE ADULT - SUBJECTIVE AND OBJECTIVE BOX
INTERVAL HPI/OVERNIGHT EVENTS:    Patient was seen and examined at bedside.  Vancomycin level low.  Dose increased.  She is tolerating well.      CONSTITUTIONAL:  Negative fever or chills, feels well, good appetite  EYES:  Negative  blurry vision or double vision  CARDIOVASCULAR:  Negative for chest pain or palpitations  RESPIRATORY:  Negative for cough, wheezing, or SOB   GASTROINTESTINAL:  Negative for nausea, vomiting, diarrhea, constipation, or abdominal pain  GENITOURINARY:  Negative frequency, urgency or dysuria  NEUROLOGIC:  No headache, confusion, dizziness, lightheadedness      ANTIBIOTICS/RELEVANT:    MEDICATIONS  (STANDING):  enoxaparin Injectable 40 milliGRAM(s) SubCutaneous every 24 hours  escitalopram 10 milliGRAM(s) Oral daily  levothyroxine 50 MICROGram(s) Oral daily  loratadine 10 milliGRAM(s) Oral daily  mupirocin 2% Ointment 1 Application(s) Topical two times a day  polyethylene glycol 3350 17 Gram(s) Oral daily  scopolamine 1 mG/72 Hr(s) Patch 1 Patch Transdermal every 72 hours  senna 2 Tablet(s) Oral at bedtime  vancomycin  IVPB 1000 milliGRAM(s) IV Intermittent every 12 hours    MEDICATIONS  (PRN):  acetaminophen     Tablet .. 650 milliGRAM(s) Oral every 6 hours PRN Temp greater or equal to 38C (100.4F), Mild Pain (1 - 3)  oxyCODONE    IR 5 milliGRAM(s) Oral every 4 hours PRN Moderate Pain (4 - 6)        Vital Signs Last 24 Hrs  T(C): 36.6 (28 Jul 2024 08:41), Max: 36.7 (27 Jul 2024 16:03)  T(F): 97.9 (28 Jul 2024 08:41), Max: 98 (27 Jul 2024 16:03)  HR: 65 (28 Jul 2024 08:41) (63 - 75)  BP: 120/76 (28 Jul 2024 08:41) (120/72 - 131/82)  BP(mean): --  RR: 18 (28 Jul 2024 08:41) (16 - 18)  SpO2: 100% (28 Jul 2024 08:41) (97% - 100%)    Parameters below as of 28 Jul 2024 08:41  Patient On (Oxygen Delivery Method): room air        PHYSICAL EXAM:  Constitutional: non-toxic, no distress  Eyes:BROOKS, EOMI  Ear/Nose/Throat: no oral lesion, no sinus tenderness on percussion	  Neck:  supple  Respiratory: CTA markus  Cardiovascular: S1S2 RRR, no murmurs  Gastrointestinal:soft, (+) BS, no HSM  Extremities:no e/e/c  Vascular: DP Pulse:	right normal; left normal      LABS:                        12.7   5.96  )-----------( 191      ( 28 Jul 2024 00:56 )             36.7     07-28    140  |  103  |  20  ----------------------------<  103<H>  4.2   |  28  |  0.72    Ca    9.3      28 Jul 2024 00:56  Phos  4.8     07-28  Mg     2.1     07-28        Urinalysis Basic - ( 28 Jul 2024 00:56 )    Color: x / Appearance: x / SG: x / pH: x  Gluc: 103 mg/dL / Ketone: x  / Bili: x / Urobili: x   Blood: x / Protein: x / Nitrite: x   Leuk Esterase: x / RBC: x / WBC x   Sq Epi: x / Non Sq Epi: x / Bacteria: x        MICROBIOLOGY:    Culture - Surgical Swab (07.23.24 @ 17:00)    Specimen Source: .Surgical Swab explanted mesh or spec   Culture Results:   No growth    Culture - Surgical Swab (07.23.24 @ 17:00)    Specimen Source: .Surgical Swab subnoccipital superficial wound or spec   Culture Results:   Numerous Corynebacterium species "Susceptibilities not performed"        RADIOLOGY & ADDITIONAL STUDIES:

## 2024-07-29 LAB
ANION GAP SERPL CALC-SCNC: 10 MMOL/L — SIGNIFICANT CHANGE UP (ref 5–17)
BUN SERPL-MCNC: 13 MG/DL — SIGNIFICANT CHANGE UP (ref 7–23)
CALCIUM SERPL-MCNC: 9.6 MG/DL — SIGNIFICANT CHANGE UP (ref 8.4–10.5)
CHLORIDE SERPL-SCNC: 100 MMOL/L — SIGNIFICANT CHANGE UP (ref 96–108)
CO2 SERPL-SCNC: 28 MMOL/L — SIGNIFICANT CHANGE UP (ref 22–31)
CREAT SERPL-MCNC: 0.69 MG/DL — SIGNIFICANT CHANGE UP (ref 0.5–1.3)
EGFR: 101 ML/MIN/1.73M2 — SIGNIFICANT CHANGE UP
GLUCOSE SERPL-MCNC: 112 MG/DL — HIGH (ref 70–99)
HCT VFR BLD CALC: 40.6 % — SIGNIFICANT CHANGE UP (ref 34.5–45)
HGB BLD-MCNC: 13.6 G/DL — SIGNIFICANT CHANGE UP (ref 11.5–15.5)
MAGNESIUM SERPL-MCNC: 2.2 MG/DL — SIGNIFICANT CHANGE UP (ref 1.6–2.6)
MCHC RBC-ENTMCNC: 32.5 PG — SIGNIFICANT CHANGE UP (ref 27–34)
MCHC RBC-ENTMCNC: 33.5 GM/DL — SIGNIFICANT CHANGE UP (ref 32–36)
MCV RBC AUTO: 96.9 FL — SIGNIFICANT CHANGE UP (ref 80–100)
NRBC # BLD: 0 /100 WBCS — SIGNIFICANT CHANGE UP (ref 0–0)
PHOSPHATE SERPL-MCNC: 3.5 MG/DL — SIGNIFICANT CHANGE UP (ref 2.5–4.5)
PLATELET # BLD AUTO: 224 K/UL — SIGNIFICANT CHANGE UP (ref 150–400)
POTASSIUM SERPL-MCNC: 4.3 MMOL/L — SIGNIFICANT CHANGE UP (ref 3.5–5.3)
POTASSIUM SERPL-SCNC: 4.3 MMOL/L — SIGNIFICANT CHANGE UP (ref 3.5–5.3)
RBC # BLD: 4.19 M/UL — SIGNIFICANT CHANGE UP (ref 3.8–5.2)
RBC # FLD: 11.9 % — SIGNIFICANT CHANGE UP (ref 10.3–14.5)
SODIUM SERPL-SCNC: 138 MMOL/L — SIGNIFICANT CHANGE UP (ref 135–145)
VANCOMYCIN TROUGH SERPL-MCNC: 7.7 UG/ML — LOW (ref 10–20)
WBC # BLD: 7.42 K/UL — SIGNIFICANT CHANGE UP (ref 3.8–10.5)
WBC # FLD AUTO: 7.42 K/UL — SIGNIFICANT CHANGE UP (ref 3.8–10.5)

## 2024-07-29 PROCEDURE — 36569 INSJ PICC 5 YR+ W/O IMAGING: CPT

## 2024-07-29 PROCEDURE — 99233 SBSQ HOSP IP/OBS HIGH 50: CPT

## 2024-07-29 PROCEDURE — 99232 SBSQ HOSP IP/OBS MODERATE 35: CPT

## 2024-07-29 PROCEDURE — 76937 US GUIDE VASCULAR ACCESS: CPT | Mod: 26,59

## 2024-07-29 RX ORDER — CHLORHEXIDINE GLUCONATE 500 MG/1
1 CLOTH TOPICAL
Refills: 0 | Status: DISCONTINUED | OUTPATIENT
Start: 2024-07-29 | End: 2024-08-01

## 2024-07-29 RX ORDER — BACTERIOSTATIC SODIUM CHLORIDE 0.9 %
10 VIAL (ML) INJECTION
Refills: 0 | Status: DISCONTINUED | OUTPATIENT
Start: 2024-07-29 | End: 2024-08-01

## 2024-07-29 RX ORDER — VANCOMYCIN HYDROCHLORIDE 5 G/100ML
1250 INJECTION, POWDER, LYOPHILIZED, FOR SOLUTION INTRAVENOUS EVERY 12 HOURS
Refills: 0 | Status: DISCONTINUED | OUTPATIENT
Start: 2024-07-29 | End: 2024-07-31

## 2024-07-29 RX ADMIN — MUPIROCIN CALCIUM 1 APPLICATION(S): 20 CREAM TOPICAL at 06:10

## 2024-07-29 RX ADMIN — MUPIROCIN CALCIUM 1 APPLICATION(S): 20 CREAM TOPICAL at 18:50

## 2024-07-29 RX ADMIN — VANCOMYCIN HYDROCHLORIDE 166.67 MILLIGRAM(S): 5 INJECTION, POWDER, LYOPHILIZED, FOR SOLUTION INTRAVENOUS at 18:34

## 2024-07-29 RX ADMIN — Medication 650 MILLIGRAM(S): at 11:55

## 2024-07-29 RX ADMIN — Medication 650 MILLIGRAM(S): at 22:40

## 2024-07-29 RX ADMIN — Medication 650 MILLIGRAM(S): at 12:55

## 2024-07-29 RX ADMIN — Medication 650 MILLIGRAM(S): at 21:40

## 2024-07-29 RX ADMIN — ENOXAPARIN SODIUM 40 MILLIGRAM(S): 120 INJECTION SUBCUTANEOUS at 21:41

## 2024-07-29 RX ADMIN — VANCOMYCIN HYDROCHLORIDE 250 MILLIGRAM(S): 5 INJECTION, POWDER, LYOPHILIZED, FOR SOLUTION INTRAVENOUS at 06:10

## 2024-07-29 RX ADMIN — Medication 10 MILLIGRAM(S): at 11:55

## 2024-07-29 RX ADMIN — Medication 650 MILLIGRAM(S): at 07:09

## 2024-07-29 RX ADMIN — Medication 650 MILLIGRAM(S): at 06:09

## 2024-07-29 RX ADMIN — Medication 50 MICROGRAM(S): at 06:10

## 2024-07-29 NOTE — DIETITIAN INITIAL EVALUATION ADULT - OTHER INFO
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 Mcbride's outpatient office with complaints of wound drainage from surgical site incision that has started on Friday. Patient denies headaches, nausea/vomiting, lightheadedness, dizziness, fevers, and chills. Patient denies pain to the surgical site. Admitted to neurosurgery for wound drainage from surgical site incision.    Pt seen in room for nutrition assessment. Pt reports fair to good appetite PTA and during hospital stay. As per diet recall PTA: pt endorsed she eats various foods, protein sources (meat/fish/eggs/low fat dairy/plant protein sources), vegetables/fruits, whole grains/starches, etc. Currently on regular diet, tolerating well, noted with ~50-75% PO intakes overall. No cultural, Sikhism, or ethnic food preferences noted. Noted with strawberry food allergy, this is noted in the system already. Denies major/significant wt changes, reports wt stability at current wt. Dosing wt: ~126 pounds, Ideal body weight: 120 pounds, pt is ~105% of ideal body weight. No noted nausea, vomiting, diarrhea, constipation, last BM on 7/27/24. No edema noted. Skin: left head region surgical incisions, no pressure ulcers. Mason: 20. No issues chewing or swallowing noted. Denies pain. Labs reviewed: elevated serum Glucose (112); RD to continue to monitor trends. Nutritionally pertinent medications/supplements: IV antibiotics, synthroid [administer 30-60 minutes before food; separate at least 4hrs from calcium or iron-containing products or bile acid sequestrants], senna, MIRALAX. Observed pt with no overt signs of muscle or fat wasting. Based on ASPEN guidelines, pt does not meet criteria for malnutrition at this time. Pt amenable to education; RD provided education in regards to the importance of adequate macro and micronutrients, as well as hydration to support ADLs, maintain energy levels and overall functional/nutritional status. General healthful education provided. Nutrient-dense foods promoted. Pt's diet reviewed. RD discussed pt's elevated nutrient needs related to postoperative demands, emphasizing the role that protein plays in the healing process. Pt was receptive and verbalized understanding. No additional nutrition-related concerns. Will continue to follow. Additional nutrition recommendations below to follow.

## 2024-07-29 NOTE — DIETITIAN INITIAL EVALUATION ADULT - PERTINENT MEDS FT
MEDICATIONS  (STANDING):  enoxaparin Injectable 40 milliGRAM(s) SubCutaneous every 24 hours  escitalopram 10 milliGRAM(s) Oral daily  levothyroxine 50 MICROGram(s) Oral daily  loratadine 10 milliGRAM(s) Oral daily  mupirocin 2% Ointment 1 Application(s) Topical two times a day  polyethylene glycol 3350 17 Gram(s) Oral daily  scopolamine 1 mG/72 Hr(s) Patch 1 Patch Transdermal every 72 hours  senna 2 Tablet(s) Oral at bedtime  vancomycin  IVPB 1000 milliGRAM(s) IV Intermittent every 12 hours    MEDICATIONS  (PRN):  acetaminophen     Tablet .. 650 milliGRAM(s) Oral every 6 hours PRN Temp greater or equal to 38C (100.4F), Mild Pain (1 - 3)  oxyCODONE    IR 5 milliGRAM(s) Oral every 4 hours PRN Moderate Pain (4 - 6)

## 2024-07-29 NOTE — DIETITIAN INITIAL EVALUATION ADULT - PERTINENT LABORATORY DATA
07-29    138  |  100  |  13  ----------------------------<  112<H>  4.3   |  28  |  0.69    Ca    9.6      29 Jul 2024 05:30  Phos  3.5     07-29  Mg     2.2     07-29    A1C with Estimated Average Glucose Result: 5.4 % (06-20-24 @ 05:09)

## 2024-07-29 NOTE — PROGRESS NOTE ADULT - SUBJECTIVE AND OBJECTIVE BOX
INFECTIOUS DISEASES CONSULT FOLLOW-UP NOTE    INTERVAL HPI/OVERNIGHT EVENTS:    Patient seen and examined at bedside. NAZIA. Afebrile. No complaints       ROS:   Constitutional, eyes, ENT, cardiovascular, respiratory, gastrointestinal, genitourinary, integumentary, neurological, psychiatric and heme/lymph are otherwise negative other than noted above       ANTIBIOTICS/RELEVANT:    MEDICATIONS  (STANDING):  enoxaparin Injectable 40 milliGRAM(s) SubCutaneous every 24 hours  escitalopram 10 milliGRAM(s) Oral daily  levothyroxine 50 MICROGram(s) Oral daily  loratadine 10 milliGRAM(s) Oral daily  mupirocin 2% Ointment 1 Application(s) Topical two times a day  polyethylene glycol 3350 17 Gram(s) Oral daily  scopolamine 1 mG/72 Hr(s) Patch 1 Patch Transdermal every 72 hours  senna 2 Tablet(s) Oral at bedtime  vancomycin  IVPB 1000 milliGRAM(s) IV Intermittent every 12 hours    MEDICATIONS  (PRN):  acetaminophen     Tablet .. 650 milliGRAM(s) Oral every 6 hours PRN Temp greater or equal to 38C (100.4F), Mild Pain (1 - 3)  oxyCODONE    IR 5 milliGRAM(s) Oral every 4 hours PRN Moderate Pain (4 - 6)        Vital Signs Last 24 Hrs  T(C): 36.9 (29 Jul 2024 08:52), Max: 36.9 (29 Jul 2024 08:52)  T(F): 98.4 (29 Jul 2024 08:52), Max: 98.4 (29 Jul 2024 08:52)  HR: 81 (29 Jul 2024 08:52) (66 - 83)  BP: 129/74 (29 Jul 2024 08:52) (121/72 - 143/90)  BP(mean): --  RR: 16 (29 Jul 2024 08:52) (16 - 18)  SpO2: 100% (29 Jul 2024 08:52) (96% - 100%)    Parameters below as of 29 Jul 2024 08:52  Patient On (Oxygen Delivery Method): room air        PHYSICAL EXAM:  Constitutional: alert, NAD  Eyes: the sclera and conjunctiva were normal.   Head: Wound noted L posterior base of neck. not actively draining. mild surrounding erythema   ENT: the ears and nose were normal in appearance.   Neck: the appearance of the neck was normal and the neck was supple.   Pulmonary: no respiratory distress and lungs were clear to auscultation bilaterally.   Heart: heart rate was normal and rhythm regular, normal S1 and S2  Vascular:. there was no peripheral edema  Abdomen: normal bowel sounds, soft, non-tender  Neurological: no focal deficits.   Psychiatric: the affect was normal        LABS:                        13.6   7.42  )-----------( 224      ( 29 Jul 2024 05:30 )             40.6     07-29    138  |  100  |  13  ----------------------------<  112<H>  4.3   |  28  |  0.69    Ca    9.6      29 Jul 2024 05:30  Phos  3.5     07-29  Mg     2.2     07-29        Urinalysis Basic - ( 29 Jul 2024 05:30 )    Color: x / Appearance: x / SG: x / pH: x  Gluc: 112 mg/dL / Ketone: x  / Bili: x / Urobili: x   Blood: x / Protein: x / Nitrite: x   Leuk Esterase: x / RBC: x / WBC x   Sq Epi: x / Non Sq Epi: x / Bacteria: x        MICROBIOLOGY:    Culture - Surgical Swab (collected 07-23-24 @ 17:00)  Source: .Surgical Swab explanted mesh or spec  Final Report (07-28-24 @ 16:43):    No growth at 5 days    Culture - Surgical Swab (collected 07-23-24 @ 17:00)  Source: .Surgical Swab subnoccipital  wound  deep or spec  Final Report (07-28-24 @ 22:36):    Few Cutibacterium acnes "Susceptibilities not performed"    Rare Corynebacterium species "Susceptibilities not performed"    Culture - Surgical Swab (collected 07-23-24 @ 17:00)  Source: .Surgical Swab subnoccipital superficial wound or spec  Final Report (07-28-24 @ 16:32):    Numerous Corynebacterium species "Susceptibilities not performed"    Culture - Surgical Swab (collected 07-23-24 @ 17:00)  Source: .Surgical Swab subnoccipital wound or spec  Final Report (07-28-24 @ 16:33):    Rare Staphylococcus epidermidis "Susceptibilities not performed"    Few Corynebacterium bovis "Susceptibilities not performed"    Culture - Blood (collected 07-22-24 @ 18:45)  Source: .Blood Blood-Peripheral  Final Report (07-28-24 @ 01:00):    No growth at 5 days    Culture - Blood (collected 07-22-24 @ 18:30)  Source: .Blood Blood-Peripheral  Final Report (07-28-24 @ 01:00):    No growth at 5 days        RADIOLOGY & ADDITIONAL STUDIES:  Reviewed

## 2024-07-29 NOTE — DIETITIAN INITIAL EVALUATION ADULT - ADD RECOMMEND
1. Continue with current diet order (regular diet)  2. Encourage pt to meet nutritional needs as able   3. Monitor PO intakes, trend weights (weekly), monitor skin integrity, monitor labs (electrolytes, CMP), monitor GI function  4. Encourage adherence to diet education (reinforce as able)  5. Pain and bowel regimen per team   6. Will continue to assess/honor preferences as able   7. Align nutrition interventions with goals of care at all times

## 2024-07-29 NOTE — PROGRESS NOTE ADULT - SUBJECTIVE AND OBJECTIVE BOX
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 Mcbride's outpatient office with complaints of wound drainage from surgical site incision that has started on Friday. Patient denies headaches, nausea/vomiting, lightheadedness, dizziness, fevers, and chills. Patient denies pain to the surgical site. Admitted to neurosurgery for wound drainage from surgical site incision.  (2024 18:27)    OVERNIGHT EVENTS: Seen and examined in 8WO. Denies HA, N/V, chest pain, shortness of breath, new weakness or numbness.     HOSPITAL COURSE:  : admit to neurosurgery for wound drainge from surgical site. MRI head w/ and w/o orderd. Infectious workup ordered (ESR/CRP/Blood cultures)   : NAZIA ovn, OR today, POD 0 excisional debridement of wound of scalp including bone  : POD 1, NAZIA ovn.   : POD 2, NAZIA overnight. OR cxs show NGTD. ID consulted. OR cxs growing staph epi and corynebacterium bovis  : POD 3, NAZIA overnight. Started vancomycin 750mg q12 per ID recs. Ancef dc'suzette. SQL tonight. Dressing changes BID with mupirocin.  : POD4, NAZIA overnight  : POD5, NAZIA overnight, Vancomycin increased to 1g q12 due to subtherapeutic vanc trough.   : POD 6. NAZIA overnight, neuro stable.     Vital Signs Last 24 Hrs  T(C): 36.7 (2024 21:09), Max: 36.7 (2024 21:09)  T(F): 98 (2024 21:09), Max: 98 (2024 21:09)  HR: 75 (2024 21:09) (63 - 83)  BP: 121/72 (2024 21:09) (120/72 - 143/90)  BP(mean): --  RR: 18 (2024 21:09) (18 - 18)  SpO2: 97% (2024 21:09) (96% - 100%)    Parameters below as of 2024 21:09  Patient On (Oxygen Delivery Method): room air        I&O's Summary      PHYSICAL EXAM:  General: NAD, pt is comfortably sitting up in bed, on room air  HEENT: PERRL 3mm briskly reactive, EOMI b/l, face symmetric, tongue midline, neck FROM  Cardiovascular: RRR, S1, S2  Respiratory: breathing non-labored on RA, chest rise symmetric  GI: abd soft, NTND   Neuro: A&Ox3, No aphasia, speech clear, no dysmetria, no pronator drift. Follows commands.  NELSON x4 spontaneously, 5/5 strength in all extremities throughout. Sensation intact  Extremities: distal pulses 2+ x4  Wound/incision: left incision w sutures and mupirocin, c/d/i   Drain: n/a    TUBES/LINES:  [] Burleson  [] Wound Drains  [] Others      DIET:  [] NPO  [x] Mechanical  [] Tube feeds    LABS:                        12.7   5.96  )-----------( 191      ( 2024 00:56 )             36.7         140  |  103  |  20  ----------------------------<  103<H>  4.2   |  28  |  0.72    Ca    9.3      2024 00:56  Phos  4.8       Mg     2.1             Urinalysis Basic - ( 2024 00:56 )    Color: x / Appearance: x / SG: x / pH: x  Gluc: 103 mg/dL / Ketone: x  / Bili: x / Urobili: x   Blood: x / Protein: x / Nitrite: x   Leuk Esterase: x / RBC: x / WBC x   Sq Epi: x / Non Sq Epi: x / Bacteria: x          CAPILLARY BLOOD GLUCOSE          Drug Levels: [] N/A  Vancomycin Level, Trough: 4.0 ug/mL ( @ 00:56)    CSF Analysis: [] N/A      Allergies    Decadron (Flushing)  gabapentin (Rash)  strawberry (Unknown)  latex (Flushing)  tetracycline (Flushing)  cefuroxime (Flushing)  Zithromax (Flushing)    Intolerances      MEDICATIONS:  Antibiotics:  vancomycin  IVPB 1000 milliGRAM(s) IV Intermittent every 12 hours    Neuro:  acetaminophen     Tablet .. 650 milliGRAM(s) Oral every 6 hours PRN  escitalopram 10 milliGRAM(s) Oral daily  oxyCODONE    IR 5 milliGRAM(s) Oral every 4 hours PRN  scopolamine 1 mG/72 Hr(s) Patch 1 Patch Transdermal every 72 hours    Anticoagulation:  enoxaparin Injectable 40 milliGRAM(s) SubCutaneous every 24 hours    OTHER:  levothyroxine 50 MICROGram(s) Oral daily  loratadine 10 milliGRAM(s) Oral daily  mupirocin 2% Ointment 1 Application(s) Topical two times a day  polyethylene glycol 3350 17 Gram(s) Oral daily  senna 2 Tablet(s) Oral at bedtime    IVF:    CULTURES:  Culture Results:   Rare Staphylococcus epidermidis "Susceptibilities not performed"  Few Corynebacterium bovis "Susceptibilities not performed" ( @ 17:00)  Culture Results:   Numerous Corynebacterium species "Susceptibilities not performed" ( @ 17:00)    RADIOLOGY & ADDITIONAL TESTS:  n/a    ASSESSMENT:  58 y/o female PMH hypothyroidism, depression, s/p left craniotomy, exploration of foramen magnum, 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 Mcbride's outpatient office with complaints of wound drainage from surgical site incision that has started on Friday. Patient denies headaches, nausea/vomiting, lightheadedness, dizziness, fevers, and chills. Patient denies pain to the surgical site. Admitted to to [facility] for further management. Now s/p excisional debridement of wound of scalp including bone ().    POST-OPERATIVECOMPLICATION    Encounter for follow-up examination after completed treatment for conditions other than malignant neoplasm    Encounter for other specified aftercare    Handoff    MEWS Score    MVP (mitral valve prolapse)    Atypical facial pain    Eczema    H/O headache    Osteoarthritis    H/O bone cyst    Hypothyroidism    Endometriosis    Wound dehiscence    Wound dehiscence    Excisional debridement of wound of scalp including bone    Post-operative complication    Drainage from wound    Drainage from wound    Hypothyroidism    MVP (mitral valve prolapse)    Pre-op exam    Anxiety and depression    Wound dehiscence    Excisional debridement of wound of scalp including bone    H/O myomectomy    Atypical facial pain    H/O eczema    H/O headache    H/O: osteoarthritis    H/O bone cyst    H/O carpal tunnel repair     product of IVF pregnancy    WOUND CHECK    31    Hypothyroidism    Anxiety and depression    SysAdmin_VisitLink        PLAN:  Neuro  - neuro/vitals q4  - MRI head w/ and w/o done  - Anxiety/depression: lexapro 10mg qd    Cardiology   - SBP <160    Respiratory   - RA, IS    GI   - regular diet  - bowel regimen, last BM     Renal   - IVL, voiding    Endocrine   - a1c 5.4   - hx of hypothyroidism, c/w synthroid 50 mcg daily.     ID  - afebrile  - Vancomycin 1000mg BID (-)  - OR cx from : growing cornyebacterium, staph epi  - ESR/CRP negative  - blood cultures NGTD     Heme  - dvt ppx: SCDs, SQL    Misc  - mupirocin and xeroform dressing changes BID    Dispo: regional, full code    Case discussed with Dr. Mcbride and Dr. Camp     Assessment:  Present when checked    []  GCS  E   V  M     Heart Failure: []Acute, [] acute on chronic , []chronic  Heart Failure:  [] Diastolic (HFpEF), [] Systolic (HFrEF), []Combined (HFpEF and HFrEF), [] RHF, [] Pulm HTN, [] Other    [] JOHN, [] ATN, [] AIN, [] other  [] CKD1, [] CKD2, [] CKD 3, [] CKD 4, [] CKD 5, []ESRD    Encephalopathy: [] Metabolic, [] Hepatic, [] toxic, [] Neurological, [] Other    Abnormal Nurtitional Status: [] malnurtition (see nutrition note), [ ]underweight: BMI < 19, [] morbid obesity: BMI >40, [] Cachexia    [] Sepsis  [] hypovolemic shock,[] cardiogenic shock, [] hemorrhagic shock, [] neuogenic shock  [] Acute Respiratory Failure  []Cerebral edema, [] Brain compression/ herniation,   [] Functional quadriplegia  [] Acute blood loss anemia

## 2024-07-29 NOTE — PROGRESS NOTE ADULT - ASSESSMENT
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 Mcbride's outpatient office with complaints of wound drainage from surgical site incision now s/p excisional debridement of wound of scalp including bone (7/23)

## 2024-07-29 NOTE — DIETITIAN INITIAL EVALUATION ADULT - PERSON TAUGHT/METHOD
Pt amenable to education; RD provided education in regards to the importance of adequate macro and micronutrients, as well as hydration to support ADLs, maintain energy levels and overall functional/nutritional status. General healthful education provided. Nutrient-dense foods promoted. Pt's diet reviewed. RD discussed pt's elevated nutrient needs related to postoperative demands, emphasizing the role that protein plays in the healing process. Pt was receptive and verbalized understanding./verbal instruction/patient instructed

## 2024-07-29 NOTE — DIETITIAN INITIAL EVALUATION ADULT - OTHER CALCULATIONS
Pt is within % ideal body weight, thus actual body weight used for all calculations. Needs adjusted for age, clinical condition, postoperative demands.

## 2024-07-29 NOTE — PROGRESS NOTE ADULT - SUBJECTIVE AND OBJECTIVE BOX
Patient is a 59y old  Female who presents with a chief complaint of wound drainage from surgical site (29 Jul 2024 10:32)      SUBJECTIVE:  Patient seen and examined at bedside. Sitting in bedside chair, comfortable.  Slight pain at incision site, no drainage, no fevers/chills    ROS:  Denies headache, vision changes, neck pain, cough, SOB, chest pain, Abdominal pain, N/V, dysuria or new rash.  All other ROS negative except as above    Vital Signs Last 12 Hrs  T(F): 98.4 (07-29-24 @ 08:52), Max: 98.4 (07-29-24 @ 08:52)  HR: 81 (07-29-24 @ 08:52) (66 - 81)  BP: 129/74 (07-29-24 @ 08:52) (122/69 - 129/74)  BP(mean): --  RR: 16 (07-29-24 @ 08:52) (16 - 16)  SpO2: 100% (07-29-24 @ 08:52) (99% - 100%)  I&O's Summary      PHYSICAL EXAM:  Constitutional: NAD, comfortable in bed.  HEENT: PERRLA, EOMI, MMM  Neck: L neck incision c/d/i  Respiratory: CTA B/L. No w/r/r.   Cardiovascular: RRR, normal S1 and S2, no m/r/g.   Gastrointestinal: +BS, soft NTND   Extremities: wwp; no cyanosis, clubbing or edema.   Neurological: AAOx3, strength and sensation intact throughout.   Skin: No gross skin abnormalities or rashes        LABS:                        13.6   7.42  )-----------( 224      ( 29 Jul 2024 05:30 )             40.6     07-29    138  |  100  |  13  ----------------------------<  112<H>  4.3   |  28  |  0.69    Ca    9.6      29 Jul 2024 05:30  Phos  3.5     07-29  Mg     2.2     07-29        Urinalysis Basic - ( 29 Jul 2024 05:30 )    Color: x / Appearance: x / SG: x / pH: x  Gluc: 112 mg/dL / Ketone: x  / Bili: x / Urobili: x   Blood: x / Protein: x / Nitrite: x   Leuk Esterase: x / RBC: x / WBC x   Sq Epi: x / Non Sq Epi: x / Bacteria: x          RADIOLOGY & ADDITIONAL TESTS:  No new imaging    MEDICATIONS  (STANDING):  enoxaparin Injectable 40 milliGRAM(s) SubCutaneous every 24 hours  escitalopram 10 milliGRAM(s) Oral daily  levothyroxine 50 MICROGram(s) Oral daily  loratadine 10 milliGRAM(s) Oral daily  mupirocin 2% Ointment 1 Application(s) Topical two times a day  polyethylene glycol 3350 17 Gram(s) Oral daily  scopolamine 1 mG/72 Hr(s) Patch 1 Patch Transdermal every 72 hours  senna 2 Tablet(s) Oral at bedtime  vancomycin  IVPB 1000 milliGRAM(s) IV Intermittent every 12 hours    MEDICATIONS  (PRN):  acetaminophen     Tablet .. 650 milliGRAM(s) Oral every 6 hours PRN Temp greater or equal to 38C (100.4F), Mild Pain (1 - 3)  oxyCODONE    IR 5 milliGRAM(s) Oral every 4 hours PRN Moderate Pain (4 - 6)

## 2024-07-29 NOTE — PROGRESS NOTE ADULT - ASSESSMENT
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 Mcbride's outpatient office with complaints of wound drainage found to have wound dehiscence s/p Excisional debridement of wound of scalp including bone 7/23. Per NSGY, all superficial, low concern for OM. Patient is stable and well appearing- afebrile, no leukocytosis, ESR/CRP wnl. OR cultures 7/23 -  Corynebacterium grew in both superficial cultures; staph epi also grew in one of the superficial cultures- likely contaminant but will also be covered by regimen. Deep culture grew Corynebacterium and cutibacterium acnes. Corynebacterium is typically a very resistant organism with few antibiotic options- cannot use Doxycycline (tetracycline allergy) or  Linezolid (interacts with Lexapro- serotonin syndrome). Best option for patient is vancomycin- d/w patient, she is agreeable with plan and is willing to stay until therapeutic vanc trough is obtained.      bcxs 7/22 no growth (final)    Suggest:  -continue vancomycin 1g IV Q12. Check trough prior to 4th dose (due around 5 pm tonight)  -Given low trough on 7/28 and CrCl > 50, we can NOT make her vancomycin dose once daily for patient convenience.   -anticipate 10 days of antibiotics from date of OR   -Ok to place PICC line     Team 2 will follow you.    Case d/w primary team.  Final recommendation pending attending note.    Vandana Stanford, Infectious Diseases PA  Please reach out for any questions 9 am-5pm.   For evenings and weekends, please call the ID physician on call. 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 Mcbride's outpatient office with complaints of wound drainage found to have wound dehiscence s/p Excisional debridement of wound of scalp including bone 7/23. Per NSGY, all superficial, low concern for OM. Patient is stable and well appearing- afebrile, no leukocytosis, ESR/CRP wnl. OR cultures 7/23 -  Corynebacterium grew in both superficial cultures; staph epi also grew in one of the superficial cultures- likely contaminant but will also be covered by regimen. Deep culture grew Corynebacterium and cutibacterium acnes. Corynebacterium is typically a very resistant organism with few antibiotic options- cannot use Doxycycline (tetracycline allergy) or  Linezolid (interacts with Lexapro- serotonin syndrome). Best option for patient is vancomycin- d/w patient, she is agreeable with plan and is willing to stay until therapeutic vanc trough is obtained.      bcxs 7/22 no growth (final)    Suggest:  -continue vancomycin 1g IV Q12. Check trough prior to 4th dose (due around 5 pm tonight)  -Given low trough on 7/28 and CrCl > 50, we can NOT make her vancomycin dose once daily for patient convenience.   -anticipate 10 days of IV Vancomycn (7/26 - 8/5/24)  -Ok to place PICC line     Team 2 will follow you.    Case d/w primary team.  Final recommendation pending attending note.    Vandana Stanford, Infectious Diseases PA  Please reach out for any questions 9 am-5pm.   For evenings and weekends, please call the ID physician on call.

## 2024-07-30 PROCEDURE — 99232 SBSQ HOSP IP/OBS MODERATE 35: CPT

## 2024-07-30 PROCEDURE — 99233 SBSQ HOSP IP/OBS HIGH 50: CPT

## 2024-07-30 RX ADMIN — Medication 650 MILLIGRAM(S): at 06:30

## 2024-07-30 RX ADMIN — Medication 10 MILLIGRAM(S): at 13:14

## 2024-07-30 RX ADMIN — VANCOMYCIN HYDROCHLORIDE 166.67 MILLIGRAM(S): 5 INJECTION, POWDER, LYOPHILIZED, FOR SOLUTION INTRAVENOUS at 06:36

## 2024-07-30 RX ADMIN — Medication 10 MILLIGRAM(S): at 13:13

## 2024-07-30 RX ADMIN — Medication 650 MILLIGRAM(S): at 13:54

## 2024-07-30 RX ADMIN — Medication 650 MILLIGRAM(S): at 23:27

## 2024-07-30 RX ADMIN — CHLORHEXIDINE GLUCONATE 1 APPLICATION(S): 500 CLOTH TOPICAL at 06:36

## 2024-07-30 RX ADMIN — ENOXAPARIN SODIUM 40 MILLIGRAM(S): 120 INJECTION SUBCUTANEOUS at 21:37

## 2024-07-30 RX ADMIN — Medication 650 MILLIGRAM(S): at 07:30

## 2024-07-30 RX ADMIN — MUPIROCIN CALCIUM 1 APPLICATION(S): 20 CREAM TOPICAL at 06:36

## 2024-07-30 RX ADMIN — Medication 650 MILLIGRAM(S): at 22:27

## 2024-07-30 RX ADMIN — Medication 650 MILLIGRAM(S): at 14:33

## 2024-07-30 RX ADMIN — MUPIROCIN CALCIUM 1 APPLICATION(S): 20 CREAM TOPICAL at 18:14

## 2024-07-30 RX ADMIN — Medication 50 MICROGRAM(S): at 06:31

## 2024-07-30 RX ADMIN — VANCOMYCIN HYDROCHLORIDE 166.67 MILLIGRAM(S): 5 INJECTION, POWDER, LYOPHILIZED, FOR SOLUTION INTRAVENOUS at 18:14

## 2024-07-30 NOTE — PROGRESS NOTE ADULT - SUBJECTIVE AND OBJECTIVE BOX
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 Mcbride's outpatient office with complaints of wound drainage from surgical site incision that has started on Friday. Patient denies headaches, nausea/vomiting, lightheadedness, dizziness, fevers, and chills. Patient denies pain to the surgical site. Admitted to neurosurgery for wound drainage from surgical site incision.  (2024 18:27)    OVERNIGHT EVENTS: Seen and examined in 8WO. Denies HA, N/V, chest pain, shortness of breath, new weakness or numbness.     HOSPITAL COURSE:  : admit to neurosurgery for wound drainge from surgical site. MRI head w/ and w/o orderd. Infectious workup ordered (ESR/CRP/Blood cultures)   : NAZIA ovn, OR today, POD 0 excisional debridement of wound of scalp including bone  : POD 1, NAZIA ovn.   : POD 2, NAZIA overnight. OR cxs show NGTD. ID consulted. OR cxs growing staph epi and corynebacterium bovis  : POD 3, NAZIA overnight. Started vancomycin 750mg q12 per ID recs. Ancef dc'd. SQL tonight. Dressing changes BID with mupirocin.  : POD4, NAZIA overnight  : POD5, NAZIA overnight, Vancomycin increased to 1g q12 due to subtherapeutic vanc trough.   : POD 6. NAZIA overnight, neuro stable. pending PICC. PICC placed. Vanco trough subtherapeutic, dose inc to 1250 BID.   : POD 7. NAZIA overnight.     Vital Signs Last 24 Hrs  T(C): 36.6 (2024 20:40), Max: 36.9 (2024 08:52)  T(F): 97.8 (2024 20:40), Max: 98.4 (2024 08:52)  HR: 82 (2024 20:40) (66 - 86)  BP: 143/78 (2024 20:40) (122/69 - 143/78)  BP(mean): --  RR: 16 (2024 20:40) (16 - 16)  SpO2: 95% (2024 20:40) (95% - 100%)    Parameters below as of 2024 20:40  Patient On (Oxygen Delivery Method): room air        I&O's Summary      PHYSICAL EXAM:  General: NAD, pt is comfortably sitting up in bed, on room air  HEENT: PERRL 3mm briskly reactive, EOMI b/l, face symmetric, tongue midline, neck FROM  Cardiovascular: RRR, S1, S2  Respiratory: breathing non-labored on RA, chest rise symmetric  GI: abd soft, NTND   Neuro: A&Ox3, No aphasia, speech clear, no dysmetria, no pronator drift. Follows commands.  NELSON x4 spontaneously, 5/5 strength in all extremities throughout. Sensation intact  Extremities: distal pulses 2+ x4  Wound/incision: left scalp incision c/d/i w mupirocin  Drain: n/a    TUBES/LINES:  [] Burleson  [] Wound Drains  [x] Others- PICC      DIET:  [] NPO  [x] Mechanical  [] Tube feeds    LABS:                        13.6   7.42  )-----------( 224      ( 2024 05:30 )             40.6     07-    138  |  100  |  13  ----------------------------<  112<H>  4.3   |  28  |  0.69    Ca    9.6      2024 05:30  Phos  3.5       Mg     2.2             Urinalysis Basic - ( 2024 05:30 )    Color: x / Appearance: x / SG: x / pH: x  Gluc: 112 mg/dL / Ketone: x  / Bili: x / Urobili: x   Blood: x / Protein: x / Nitrite: x   Leuk Esterase: x / RBC: x / WBC x   Sq Epi: x / Non Sq Epi: x / Bacteria: x          CAPILLARY BLOOD GLUCOSE          Drug Levels: [] N/A  Vancomycin Level, Trough: 7.7 ug/mL ( @ 16:56)  Vancomycin Level, Trough: 4.0 ug/mL ( @ 00:56)    CSF Analysis: [] N/A      Allergies    Decadron (Flushing)  gabapentin (Rash)  bacitracin topical (Rash (Mild))  strawberry (Unknown)  latex (Flushing)  tetracycline (Flushing)  cefuroxime (Flushing)  Zithromax (Flushing)    Intolerances      MEDICATIONS:  Antibiotics:  vancomycin  IVPB 1250 milliGRAM(s) IV Intermittent every 12 hours    Neuro:  acetaminophen     Tablet .. 650 milliGRAM(s) Oral every 6 hours PRN  escitalopram 10 milliGRAM(s) Oral daily  scopolamine 1 mG/72 Hr(s) Patch 1 Patch Transdermal every 72 hours    Anticoagulation:  enoxaparin Injectable 40 milliGRAM(s) SubCutaneous every 24 hours    OTHER:  chlorhexidine 2% Cloths 1 Application(s) Topical <User Schedule>  levothyroxine 50 MICROGram(s) Oral daily  loratadine 10 milliGRAM(s) Oral daily  mupirocin 2% Ointment 1 Application(s) Topical two times a day  polyethylene glycol 3350 17 Gram(s) Oral daily  senna 2 Tablet(s) Oral at bedtime    IVF:    CULTURES:  Culture Results:   Rare Staphylococcus epidermidis "Susceptibilities not performed"  Few Corynebacterium bovis "Susceptibilities not performed" ( @ 17:00)  Culture Results:   Numerous Corynebacterium species "Susceptibilities not performed" ( @ 17:00)    RADIOLOGY & ADDITIONAL TESTS:      ASSESSMENT:  60 y/o female PMH hypothyroidism, depression, s/p left craniotomy, exploration of foramen magnum, 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 Mcbride's outpatient office with complaints of wound drainage from surgical site incision that has started on Friday. Patient denies headaches, nausea/vomiting, lightheadedness, dizziness, fevers, and chills. Patient denies pain to the surgical site. Admitted to to [facility] for further management. Now s/p excisional debridement of wound of scalp including bone ().      POST-OPERATIVECOMPLICATION    Encounter for follow-up examination after completed treatment for conditions other than malignant neoplasm    Encounter for other specified aftercare    Handoff    MEWS Score    MVP (mitral valve prolapse)    Atypical facial pain    Eczema    H/O headache    Osteoarthritis    H/O bone cyst    Hypothyroidism    Endometriosis    Wound dehiscence    Wound dehiscence    Excisional debridement of wound of scalp including bone    Post-operative complication    Drainage from wound    Drainage from wound    Hypothyroidism    MVP (mitral valve prolapse)    Pre-op exam    Anxiety and depression    Wound dehiscence    Excisional debridement of wound of scalp including bone    H/O myomectomy    Atypical facial pain    H/O eczema    H/O headache    H/O: osteoarthritis    H/O bone cyst    H/O carpal tunnel repair    [place] product of IVF pregnancy    WOUND CHECK    31    Hypothyroidism    Anxiety and depression    MVP (mitral valve prolapse)    Osteoarthritis    History of eczema    [email or web address]        PLAN:  Neuro  - neuro/vitals q4  - MRI head w/ and w/o done  - Anxiety/depression: lexapro 10mg qd    Cardiology   - SBP <160    Respiratory   - RA, IS    GI   - regular diet  - bowel regimen, last BM     Renal   - IVL, voiding    Endocrine   - a1c 5.4   - hx of hypothyroidism, c/w synthroid 50 mcg daily.     ID  - afebrile, (+) PICC   - Vancomycin 1250mg BID (-)  - OR cx from : growing cornyebacterium, staph epi  - ESR/CRP negative  - blood cultures NGTD     Heme  - dvt ppx: SCDs, SQL    Misc  - mupirocin and xeroform dressing changes BID    Dispo: regional, full code    Case discussed with Dr. Mcbride and Dr. Camp       Assessment:  Present when checked    []  GCS  E   V  M     Heart Failure: []Acute, [] acute on chronic , []chronic  Heart Failure:  [] Diastolic (HFpEF), [] Systolic (HFrEF), []Combined (HFpEF and HFrEF), [] RHF, [] Pulm HTN, [] Other    [] JOHN, [] ATN, [] AIN, [] other  [] CKD1, [] CKD2, [] CKD 3, [] CKD 4, [] CKD 5, []ESRD    Encephalopathy: [] Metabolic, [] Hepatic, [] toxic, [] Neurological, [] Other    Abnormal Nurtitional Status: [] malnurtition (see nutrition note), [ ]underweight: BMI < 19, [] morbid obesity: BMI >40, [] Cachexia    [] Sepsis  [] hypovolemic shock,[] cardiogenic shock, [] hemorrhagic shock, [] neuogenic shock  [] Acute Respiratory Failure  []Cerebral edema, [] Brain compression/ herniation,   [] Functional quadriplegia  [] Acute blood loss anemia

## 2024-07-30 NOTE — PROGRESS NOTE ADULT - SUBJECTIVE AND OBJECTIVE BOX
INFECTIOUS DISEASES CONSULT FOLLOW-UP NOTE    INTERVAL HPI/OVERNIGHT EVENTS:    Patient seen and examined at bedside. NAZIA. Afebrile. Denies complaints.       ROS:   Constitutional, eyes, ENT, cardiovascular, respiratory, gastrointestinal, genitourinary, integumentary, neurological, psychiatric and heme/lymph are otherwise negative other than noted above       ANTIBIOTICS/RELEVANT:    MEDICATIONS  (STANDING):  chlorhexidine 2% Cloths 1 Application(s) Topical <User Schedule>  enoxaparin Injectable 40 milliGRAM(s) SubCutaneous every 24 hours  escitalopram 10 milliGRAM(s) Oral daily  levothyroxine 50 MICROGram(s) Oral daily  loratadine 10 milliGRAM(s) Oral daily  mupirocin 2% Ointment 1 Application(s) Topical two times a day  polyethylene glycol 3350 17 Gram(s) Oral daily  scopolamine 1 mG/72 Hr(s) Patch 1 Patch Transdermal every 72 hours  senna 2 Tablet(s) Oral at bedtime  vancomycin  IVPB 1250 milliGRAM(s) IV Intermittent every 12 hours    MEDICATIONS  (PRN):  acetaminophen     Tablet .. 650 milliGRAM(s) Oral every 6 hours PRN Temp greater or equal to 38C (100.4F), Mild Pain (1 - 3)  sodium chloride 0.9% lock flush 10 milliLiter(s) IV Push every 1 hour PRN Pre/post blood products, medications, blood draw, and to maintain line patency        Vital Signs Last 24 Hrs  T(C): 36.8 (30 Jul 2024 08:30), Max: 36.8 (30 Jul 2024 08:30)  T(F): 98.2 (30 Jul 2024 08:30), Max: 98.2 (30 Jul 2024 08:30)  HR: 75 (30 Jul 2024 08:30) (66 - 86)  BP: 128/77 (30 Jul 2024 08:30) (128/77 - 143/78)  BP(mean): --  RR: 15 (30 Jul 2024 08:30) (15 - 16)  SpO2: 98% (30 Jul 2024 08:30) (95% - 98%)    Parameters below as of 30 Jul 2024 08:30  Patient On (Oxygen Delivery Method): room air      PHYSICAL EXAM:  Constitutional: alert, NAD  Eyes: the sclera and conjunctiva were normal.   Head: Wound noted L posterior base of neck. not actively draining. less surrounding erythema today  ENT: the ears and nose were normal in appearance.   Neck: the appearance of the neck was normal and the neck was supple.   Pulmonary: no respiratory distress and lungs were clear to auscultation bilaterally.   Heart: heart rate was normal and rhythm regular, normal S1 and S2  Vascular:. there was no peripheral edema  Abdomen: normal bowel sounds, soft, non-tender  Neurological: no focal deficits.   Psychiatric: the affect was normal      LABS:                        13.6   7.42  )-----------( 224      ( 29 Jul 2024 05:30 )             40.6     07-29    138  |  100  |  13  ----------------------------<  112<H>  4.3   |  28  |  0.69    Ca    9.6      29 Jul 2024 05:30  Phos  3.5     07-29  Mg     2.2     07-29        Urinalysis Basic - ( 29 Jul 2024 05:30 )    Color: x / Appearance: x / SG: x / pH: x  Gluc: 112 mg/dL / Ketone: x  / Bili: x / Urobili: x   Blood: x / Protein: x / Nitrite: x   Leuk Esterase: x / RBC: x / WBC x   Sq Epi: x / Non Sq Epi: x / Bacteria: x        MICROBIOLOGY:    Culture - Surgical Swab (collected 07-23-24 @ 17:00)  Source: .Surgical Swab subnoccipital superficial wound or spec  Final Report (07-28-24 @ 16:32):    Numerous Corynebacterium species "Susceptibilities not performed"    Culture - Surgical Swab (collected 07-23-24 @ 17:00)  Source: .Surgical Swab subnoccipital  wound  deep or spec  Final Report (07-28-24 @ 22:36):    Few Cutibacterium acnes "Susceptibilities not performed"    Rare Corynebacterium species "Susceptibilities not performed"    Culture - Surgical Swab (collected 07-23-24 @ 17:00)  Source: .Surgical Swab explanted mesh or spec  Final Report (07-28-24 @ 16:43):    No growth at 5 days    Culture - Surgical Swab (collected 07-23-24 @ 17:00)  Source: .Surgical Swab subnoccipital wound or spec  Final Report (07-28-24 @ 16:33):    Rare Staphylococcus epidermidis "Susceptibilities not performed"    Few Corynebacterium bovis "Susceptibilities not performed"        RADIOLOGY & ADDITIONAL STUDIES:  Reviewed

## 2024-07-30 NOTE — PROGRESS NOTE ADULT - SUBJECTIVE AND OBJECTIVE BOX
Patient is a 59y old  Female who presents with a chief complaint of wound drainage from surgical site (30 Jul 2024 11:41)      SUBJECTIVE:  Patient seen and examined at bedside. Feels well.  Some discomfort when PICC was places but site comfortable now and no redness    ROS:  Denies fevers, chills, headache, vision changes, neck pain, cough, SOB, chest pain, Abdominal pain, N/V, dysuria or new rash.  All other ROS negative except as above    Vital Signs Last 12 Hrs  T(F): 98.2 (07-30-24 @ 08:30), Max: 98.2 (07-30-24 @ 08:30)  HR: 75 (07-30-24 @ 08:30) (66 - 75)  BP: 128/77 (07-30-24 @ 08:30) (128/77 - 129/73)  BP(mean): --  RR: 15 (07-30-24 @ 08:30) (15 - 16)  SpO2: 98% (07-30-24 @ 08:30) (96% - 98%)  I&O's Summary      PHYSICAL EXAM:  Constitutional: NAD, comfortable in bed.  HEENT: PERRLA, EOMI, MMM  Neck: L neck incision c/d/i  Respiratory: CTA B/L. No w/r/r.   Cardiovascular: RRR, normal S1 and S2, no m/r/g.   Gastrointestinal: +BS, soft NTND   Extremities: wwp; no cyanosis, clubbing or edema. LUE PICC c/d/i  Neurological: AAOx3, strength and sensation intact throughout.   Skin: No gross skin abnormalities or rashes        LABS:                        13.6   7.42  )-----------( 224      ( 29 Jul 2024 05:30 )             40.6     07-29    138  |  100  |  13  ----------------------------<  112<H>  4.3   |  28  |  0.69    Ca    9.6      29 Jul 2024 05:30  Phos  3.5     07-29  Mg     2.2     07-29        Urinalysis Basic - ( 29 Jul 2024 05:30 )    Color: x / Appearance: x / SG: x / pH: x  Gluc: 112 mg/dL / Ketone: x  / Bili: x / Urobili: x   Blood: x / Protein: x / Nitrite: x   Leuk Esterase: x / RBC: x / WBC x   Sq Epi: x / Non Sq Epi: x / Bacteria: x          RADIOLOGY & ADDITIONAL TESTS:  No new imaging    MEDICATIONS  (STANDING):  chlorhexidine 2% Cloths 1 Application(s) Topical <User Schedule>  enoxaparin Injectable 40 milliGRAM(s) SubCutaneous every 24 hours  escitalopram 10 milliGRAM(s) Oral daily  levothyroxine 50 MICROGram(s) Oral daily  loratadine 10 milliGRAM(s) Oral daily  mupirocin 2% Ointment 1 Application(s) Topical two times a day  polyethylene glycol 3350 17 Gram(s) Oral daily  scopolamine 1 mG/72 Hr(s) Patch 1 Patch Transdermal every 72 hours  senna 2 Tablet(s) Oral at bedtime  vancomycin  IVPB 1250 milliGRAM(s) IV Intermittent every 12 hours    MEDICATIONS  (PRN):  acetaminophen     Tablet .. 650 milliGRAM(s) Oral every 6 hours PRN Temp greater or equal to 38C (100.4F), Mild Pain (1 - 3)  sodium chloride 0.9% lock flush 10 milliLiter(s) IV Push every 1 hour PRN Pre/post blood products, medications, blood draw, and to maintain line patency

## 2024-07-30 NOTE — PROGRESS NOTE ADULT - ASSESSMENT
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 Mcbride's outpatient office with complaints of wound drainage found to have wound dehiscence s/p Excisional debridement of wound of scalp including bone 7/23. Per NSGY, all superficial, low concern for OM. Patient is stable and well appearing- afebrile, no leukocytosis, ESR/CRP wnl. OR cultures 7/23 -  Corynebacterium grew in both superficial cultures; staph epi also grew in one of the superficial cultures- likely contaminant but will also be covered by regimen. Deep culture grew Corynebacterium and cutibacterium acnes. Corynebacterium is typically a very resistant organism with few antibiotic options- cannot use Doxycycline (tetracycline allergy) or Linezolid (interacts with Lexapro- serotonin syndrome). Best option for patient is vancomycin- d/w patient, she is agreeable with plan and is willing to stay until therapeutic vanc trough is obtained. PICC placed 7/29    bcxs 7/22 no growth (final)  trough was appropriately timed and subtherapeutic 7.7 7/29 -dose was increased to 1250mg IV Q12    Suggest:  -continue vancomycin 1250mg IV Q12. Check trough prior to 4th dose (due around 5:30 AM 7/31)  -Given low trough on 7/28 and CrCl > 50, we can NOT make her vancomycin dose once daily for patient convenience.   -anticipate 10 days of therapeutic IV Vancomycin (7/30 - 8/8/24). First couple days of vancomycin was at subtherapeutic trough of 4 so would not count those days towards course.     Team 2 will follow you.    Case d/w primary team.  Final recommendation pending attending note.    Vandana Stanford, Infectious Diseases PA  Please reach out for any questions 9 am-5pm.   For evenings and weekends, please call the ID physician on call.

## 2024-07-31 LAB
ANION GAP SERPL CALC-SCNC: 7 MMOL/L — SIGNIFICANT CHANGE UP (ref 5–17)
BUN SERPL-MCNC: 11 MG/DL — SIGNIFICANT CHANGE UP (ref 7–23)
CALCIUM SERPL-MCNC: 9.6 MG/DL — SIGNIFICANT CHANGE UP (ref 8.4–10.5)
CHLORIDE SERPL-SCNC: 97 MMOL/L — SIGNIFICANT CHANGE UP (ref 96–108)
CO2 SERPL-SCNC: 29 MMOL/L — SIGNIFICANT CHANGE UP (ref 22–31)
CREAT SERPL-MCNC: 0.61 MG/DL — SIGNIFICANT CHANGE UP (ref 0.5–1.3)
CULTURE RESULTS: ABNORMAL
EGFR: 104 ML/MIN/1.73M2 — SIGNIFICANT CHANGE UP
GLUCOSE SERPL-MCNC: 112 MG/DL — HIGH (ref 70–99)
HCT VFR BLD CALC: 39.1 % — SIGNIFICANT CHANGE UP (ref 34.5–45)
HGB BLD-MCNC: 13.2 G/DL — SIGNIFICANT CHANGE UP (ref 11.5–15.5)
MAGNESIUM SERPL-MCNC: 2 MG/DL — SIGNIFICANT CHANGE UP (ref 1.6–2.6)
MCHC RBC-ENTMCNC: 31.7 PG — SIGNIFICANT CHANGE UP (ref 27–34)
MCHC RBC-ENTMCNC: 33.8 GM/DL — SIGNIFICANT CHANGE UP (ref 32–36)
MCV RBC AUTO: 93.8 FL — SIGNIFICANT CHANGE UP (ref 80–100)
NRBC # BLD: 0 /100 WBCS — SIGNIFICANT CHANGE UP (ref 0–0)
PHOSPHATE SERPL-MCNC: 4.5 MG/DL — SIGNIFICANT CHANGE UP (ref 2.5–4.5)
PLATELET # BLD AUTO: 176 K/UL — SIGNIFICANT CHANGE UP (ref 150–400)
POTASSIUM SERPL-MCNC: 4.3 MMOL/L — SIGNIFICANT CHANGE UP (ref 3.5–5.3)
POTASSIUM SERPL-SCNC: 4.3 MMOL/L — SIGNIFICANT CHANGE UP (ref 3.5–5.3)
RBC # BLD: 4.17 M/UL — SIGNIFICANT CHANGE UP (ref 3.8–5.2)
RBC # FLD: 11.9 % — SIGNIFICANT CHANGE UP (ref 10.3–14.5)
SODIUM SERPL-SCNC: 133 MMOL/L — LOW (ref 135–145)
SURGICAL PATHOLOGY STUDY: SIGNIFICANT CHANGE UP
VANCOMYCIN TROUGH SERPL-MCNC: 11.9 UG/ML — SIGNIFICANT CHANGE UP (ref 10–20)
VANCOMYCIN TROUGH SERPL-MCNC: 26.9 UG/ML — CRITICAL HIGH (ref 10–20)
WBC # BLD: 5.45 K/UL — SIGNIFICANT CHANGE UP (ref 3.8–10.5)
WBC # FLD AUTO: 5.45 K/UL — SIGNIFICANT CHANGE UP (ref 3.8–10.5)

## 2024-07-31 PROCEDURE — 99233 SBSQ HOSP IP/OBS HIGH 50: CPT

## 2024-07-31 PROCEDURE — 99232 SBSQ HOSP IP/OBS MODERATE 35: CPT

## 2024-07-31 RX ORDER — VANCOMYCIN HYDROCHLORIDE 5 G/100ML
1250 INJECTION, POWDER, LYOPHILIZED, FOR SOLUTION INTRAVENOUS EVERY 12 HOURS
Refills: 0 | Status: DISCONTINUED | OUTPATIENT
Start: 2024-07-31 | End: 2024-08-01

## 2024-07-31 RX ADMIN — VANCOMYCIN HYDROCHLORIDE 166.67 MILLIGRAM(S): 5 INJECTION, POWDER, LYOPHILIZED, FOR SOLUTION INTRAVENOUS at 18:47

## 2024-07-31 RX ADMIN — MUPIROCIN CALCIUM 1 APPLICATION(S): 20 CREAM TOPICAL at 05:32

## 2024-07-31 RX ADMIN — ENOXAPARIN SODIUM 40 MILLIGRAM(S): 120 INJECTION SUBCUTANEOUS at 21:47

## 2024-07-31 RX ADMIN — CHLORHEXIDINE GLUCONATE 1 APPLICATION(S): 500 CLOTH TOPICAL at 05:33

## 2024-07-31 RX ADMIN — Medication 50 MICROGRAM(S): at 05:32

## 2024-07-31 RX ADMIN — Medication 10 MILLIGRAM(S): at 12:36

## 2024-07-31 RX ADMIN — Medication 650 MILLIGRAM(S): at 22:44

## 2024-07-31 RX ADMIN — VANCOMYCIN HYDROCHLORIDE 166.67 MILLIGRAM(S): 5 INJECTION, POWDER, LYOPHILIZED, FOR SOLUTION INTRAVENOUS at 05:31

## 2024-07-31 RX ADMIN — Medication 650 MILLIGRAM(S): at 07:57

## 2024-07-31 RX ADMIN — Medication 650 MILLIGRAM(S): at 21:44

## 2024-07-31 RX ADMIN — MUPIROCIN CALCIUM 1 APPLICATION(S): 20 CREAM TOPICAL at 17:11

## 2024-07-31 NOTE — PROGRESS NOTE ADULT - SUBJECTIVE AND OBJECTIVE BOX
INFECTIOUS DISEASES CONSULT FOLLOW-UP NOTE    INTERVAL HPI/OVERNIGHT EVENTS:    Patient seen and examined at bedside. NAZIA. Afebrile.  Denies complaints       ROS:   Constitutional, eyes, ENT, cardiovascular, respiratory, gastrointestinal, genitourinary, integumentary, neurological, psychiatric and heme/lymph are otherwise negative other than noted above       ANTIBIOTICS/RELEVANT:    MEDICATIONS  (STANDING):  chlorhexidine 2% Cloths 1 Application(s) Topical <User Schedule>  enoxaparin Injectable 40 milliGRAM(s) SubCutaneous every 24 hours  escitalopram 10 milliGRAM(s) Oral daily  levothyroxine 50 MICROGram(s) Oral daily  loratadine 10 milliGRAM(s) Oral daily  mupirocin 2% Ointment 1 Application(s) Topical two times a day  polyethylene glycol 3350 17 Gram(s) Oral daily  scopolamine 1 mG/72 Hr(s) Patch 1 Patch Transdermal every 72 hours  senna 2 Tablet(s) Oral at bedtime    MEDICATIONS  (PRN):  acetaminophen     Tablet .. 650 milliGRAM(s) Oral every 6 hours PRN Temp greater or equal to 38C (100.4F), Mild Pain (1 - 3)  sodium chloride 0.9% lock flush 10 milliLiter(s) IV Push every 1 hour PRN Pre/post blood products, medications, blood draw, and to maintain line patency        Vital Signs Last 24 Hrs  T(C): 36.8 (31 Jul 2024 08:07), Max: 36.9 (30 Jul 2024 15:58)  T(F): 98.2 (31 Jul 2024 08:07), Max: 98.4 (30 Jul 2024 15:58)  HR: 75 (31 Jul 2024 08:07) (68 - 76)  BP: 127/76 (31 Jul 2024 08:07) (125/74 - 143/78)  BP(mean): --  RR: 16 (31 Jul 2024 08:07) (16 - 18)  SpO2: 99% (31 Jul 2024 08:07) (97% - 99%)    Parameters below as of 31 Jul 2024 08:07  Patient On (Oxygen Delivery Method): room air      PHYSICAL EXAM:  Constitutional: alert, NAD  Eyes: the sclera and conjunctiva were normal.   Head: Wound noted L posterior base of neck. not actively draining. less surrounding erythema today  ENT: the ears and nose were normal in appearance.   Neck: the appearance of the neck was normal and the neck was supple.   Pulmonary: no respiratory distress and lungs were clear to auscultation bilaterally.   Heart: heart rate was normal and rhythm regular, normal S1 and S2  Vascular:. there was no peripheral edema  Abdomen: normal bowel sounds, soft, non-tender  Neurological: no focal deficits.   Psychiatric: the affect was normal      LABS:                        13.2   5.45  )-----------( 176      ( 31 Jul 2024 08:57 )             39.1     07-31    133<L>  |  97  |  11  ----------------------------<  112<H>  4.3   |  29  |  0.61    Ca    9.6      31 Jul 2024 08:57  Phos  4.5     07-31  Mg     2.0     07-31        Urinalysis Basic - ( 31 Jul 2024 08:57 )    Color: x / Appearance: x / SG: x / pH: x  Gluc: 112 mg/dL / Ketone: x  / Bili: x / Urobili: x   Blood: x / Protein: x / Nitrite: x   Leuk Esterase: x / RBC: x / WBC x   Sq Epi: x / Non Sq Epi: x / Bacteria: x        MICROBIOLOGY:      RADIOLOGY & ADDITIONAL STUDIES:  Reviewed

## 2024-07-31 NOTE — PROGRESS NOTE ADULT - NS ATTEND OPT1A GEN_ALL_CORE
History/Exam/Medical decision making
History/Exam

## 2024-07-31 NOTE — PROGRESS NOTE ADULT - ASSESSMENT
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 Mcbride's outpatient office with complaints of wound drainage from surgical site incision now s/p excisional debridement of wound of scalp including bone (7/23)

## 2024-07-31 NOTE — PROGRESS NOTE ADULT - ASSESSMENT
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 Mcbride's outpatient office with complaints of wound drainage found to have wound dehiscence s/p Excisional debridement of wound of scalp including bone 7/23. Per NSGY, all superficial, low concern for OM. Patient is stable and well appearing- afebrile, no leukocytosis, ESR/CRP wnl. OR cultures 7/23 -  Corynebacterium grew in both superficial cultures; staph epi also grew in one of the superficial cultures- likely contaminant but will also be covered by regimen. Deep culture grew Corynebacterium and cutibacterium acnes. Corynebacterium is typically a very resistant organism with few antibiotic options- cannot use Doxycycline (tetracycline allergy) or Linezolid (interacts with Lexapro- serotonin syndrome). Best option for patient is vancomycin- d/w patient, she is agreeable with plan and is willing to stay until therapeutic vanc trough is obtained. PICC placed 7/29    bcxs 7/22 no growth (final)  "trough" this morning was not appropriately timed - was drawn ~3 hours after dose was given.     Suggest:  -continue vancomycin 1250mg IV Q12. Check trough prior to next dose (due around 4:30-5 PM 7/31)  -Given low trough on 7/28 and CrCl > 50, we can NOT make her vancomycin dose once daily for patient convenience.   -anticipate 10 days of therapeutic IV Vancomycin (7/30 - 8/8/24). First couple days of vancomycin was at subtherapeutic trough of 4 so would not count those days towards course.   -Weekly labs: CMP, CBC with diff, vanc trough faxed to ID office at [phone]  -Patient to follow up with Dr. Lucio in 2 weeks ([address] [place] [address], [phone]), ID office will call patient to schedule       Team 2 will follow you.    Case d/w primary team.  Final recommendation pending attending note.    Vandana Stanford, Infectious Diseases PA  Please reach out for any questions 9 am-5pm.   For evenings and weekends, please call the ID physician on call.

## 2024-07-31 NOTE — PROGRESS NOTE ADULT - SUBJECTIVE AND OBJECTIVE BOX
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 Mcbride's outpatient office with complaints of wound drainage from surgical site incision that has started on Friday. Patient denies headaches, nausea/vomiting, lightheadedness, dizziness, fevers, and chills. Patient denies pain to the surgical site. Admitted to neurosurgery for wound drainage from surgical site incision.  (2024 18:27)      Subjective:  Patient denies any acute complaints.    Hospital course:  : admit to neurosurgery for wound drainge from surgical site. MRI head w/ and w/o orderd. Infectious workup ordered (ESR/CRP/Blood cultures)   : NAZIA ovn, OR today, POD 0 excisional debridement of wound of scalp including bone  : POD 1, NAZIA ovn.   : POD 2, NAZIA overnight. OR cxs show NGTD. ID consulted. OR cxs growing staph epi and corynebacterium bovis  : POD 3, NAZIA overnight. Started vancomycin 750mg q12 per ID recs. Ancef dc'd. SQL tonight. Dressing changes BID with mupirocin.  : POD4, NAZIA overnight  : POD5, NAZIA overnight, Vancomycin increased to 1g q12 due to subtherapeutic vanc trough.   : POD 6. NAZIA overnight, neuro stable. pending PICC. PICC placed. Vanco trough subtherapeutic, dose inc to 1250 BID.   : POD 7. NAZIA overnight.     Vital Signs Last 24 Hrs  T(C): 36.8 (2024 21:10), Max: 36.9 (2024 15:58)  T(F): 98.3 (2024 21:10), Max: 98.4 (2024 15:58)  HR: 74 (2024 21:10) (66 - 76)  BP: 125/74 (2024 21:10) (125/74 - 143/78)  BP(mean): --  RR: 16 (2024 21:10) (15 - 16)  SpO2: 98% (2024 21:10) (96% - 98%)    Parameters below as of 2024 21:10  Patient On (Oxygen Delivery Method): room air        I&O's Summary      PHYSICAL EXAM:  General: patient seen laying supine in bed in NAD  Neuro: AAOx3, follows commands, opens eyes spontaneously, speech clear and fluent, CNII-XI grossly intact, face symmetric, no pronator drift,  strength 5/5 b/l upper extremities and lower extremities, sensation intact to light touch throughout  HEENT: PERRL, EOMI  Neck: supple  Cardiac: RRR, S1S2  Pulmonary: chest rise symmetric  Abdomen: soft, nontender, nondistended  Ext: perfusing well  Skin: warm, dry  Wound: L far lateral crani incision c/d/i      LABS:                        13.6   7.42  )-----------( 224      ( 2024 05:30 )             40.6     07-29    138  |  100  |  13  ----------------------------<  112<H>  4.3   |  28  |  0.69    Ca    9.6      2024 05:30  Phos  3.5       Mg     2.2     29        Urinalysis Basic - ( 2024 05:30 )    Color: x / Appearance: x / SG: x / pH: x  Gluc: 112 mg/dL / Ketone: x  / Bili: x / Urobili: x   Blood: x / Protein: x / Nitrite: x   Leuk Esterase: x / RBC: x / WBC x   Sq Epi: x / Non Sq Epi: x / Bacteria: x          CAPILLARY BLOOD GLUCOSE          Drug Levels: [] N/A  Vancomycin Level, Trough: 7.7 ug/mL ( @ 16:56)  Vancomycin Level, Trough: 4.0 ug/mL ( @ 00:56)    CSF Analysis: [] N/A      Allergies    Decadron (Flushing)  gabapentin (Rash)  bacitracin topical (Rash (Mild))  strawberry (Unknown)  latex (Flushing)  tetracycline (Flushing)  cefuroxime (Flushing)  Zithromax (Flushing)    Intolerances      MEDICATIONS:  Antibiotics:  vancomycin  IVPB 1250 milliGRAM(s) IV Intermittent every 12 hours    Neuro:  acetaminophen     Tablet .. 650 milliGRAM(s) Oral every 6 hours PRN  escitalopram 10 milliGRAM(s) Oral daily  scopolamine 1 mG/72 Hr(s) Patch 1 Patch Transdermal every 72 hours    Anticoagulation:  enoxaparin Injectable 40 milliGRAM(s) SubCutaneous every 24 hours    OTHER:  chlorhexidine 2% Cloths 1 Application(s) Topical <User Schedule>  levothyroxine 50 MICROGram(s) Oral daily  loratadine 10 milliGRAM(s) Oral daily  mupirocin 2% Ointment 1 Application(s) Topical two times a day  polyethylene glycol 3350 17 Gram(s) Oral daily  senna 2 Tablet(s) Oral at bedtime    IVF:    CULTURES:  Culture Results:   Rare Staphylococcus epidermidis "Susceptibilities not performed"  Few Corynebacterium bovis "Susceptibilities not performed" ( @ 17:00)  Culture Results:   Numerous Corynebacterium species "Susceptibilities not performed" ( @ 17:00)    RADIOLOGY & ADDITIONAL TESTS:    POST-OPERATIVECOMPLICATION    Encounter for follow-up examination after completed treatment for conditions other than malignant neoplasm    Encounter for other specified aftercare    Handoff    MEWS Score    MVP (mitral valve prolapse)    Atypical facial pain    Eczema    H/O headache    Osteoarthritis    H/O bone cyst    Hypothyroidism    Endometriosis    Wound dehiscence    Wound dehiscence    Excisional debridement of wound of scalp including bone    Post-operative complication    Drainage from wound    Drainage from wound    Hypothyroidism    MVP (mitral valve prolapse)    Pre-op exam    Anxiety and depression    Wound dehiscence    Excisional debridement of wound of scalp including bone    H/O myomectomy    Atypical facial pain    H/O eczema    H/O headache    H/O: osteoarthritis    H/O bone cyst    H/O carpal tunnel repair    [place] product of IVF pregnancy    WOUND CHECK    31    Hypothyroidism    Anxiety and depression    MVP (mitral valve prolapse)    Osteoarthritis    History of eczema    [email or web address]        ASSESSMENT:  60 y/o female PMH hypothyroidism, depression, s/p left craniotomy, exploration of foramen magnum, 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 Mcbride's outpatient office with complaints of wound drainage from surgical site incision that has started on Friday. Patient denies headaches, nausea/vomiting, lightheadedness, dizziness, fevers, and chills. Patient denies pain to the surgical site. Admitted to to [facility] for further management. Now s/p excisional debridement of wound of scalp including bone ().    Neuro  - neuro/vitals q4  - MRI head w/ and w/o done  - Anxiety/depression: lexapro 10mg qd    Cardiology   - SBP <160    Respiratory   - RA, IS    GI   - regular diet  - bowel regimen, last BM     Renal   - IVL, voiding    Endocrine   - a1c 5.4   - hx of hypothyroidism, c/w synthroid 50 mcg daily.     ID  - afebrile, (+) PICC   - Vancomycin 1250mg BID (-)  - OR cx from : growing cornyebacterium, staph epi  - ESR/CRP negative  - blood cultures NGTD     Heme  - dvt ppx: SCDs, SQL    Misc  - mupirocin and xeroform dressing changes BID    Dispo: regional, full code    Case discussed with Dr. Mcbride and Dr. Camp       Assessment:  Present when checked    []  GCS  E   V  M     Heart Failure: []Acute, [] acute on chronic , []chronic  Heart Failure:  [] Diastolic (HFpEF), [] Systolic (HFrEF), []Combined (HFpEF and HFrEF), [] RHF, [] Pulm HTN, [] Other    [] JOHN, [] ATN, [] AIN, [] other  [] CKD1, [] CKD2, [] CKD 3, [] CKD 4, [] CKD 5, []ESRD    Encephalopathy: [] Metabolic, [] Hepatic, [] toxic, [] Neurological, [] Other    Abnormal Nurtitional Status: [] malnurtition (see nutrition note), [ ]underweight: BMI < 19, [] morbid obesity: BMI >40, [] Cachexia    [] Sepsis  [] hypovolemic shock,[] cardiogenic shock, [] hemorrhagic shock, [] neuogenic shock  [] Acute Respiratory Failure  []Cerebral edema, [] Brain compression/ herniation,   [] Functional quadriplegia  [] Acute blood loss anemia

## 2024-07-31 NOTE — PROGRESS NOTE ADULT - SUBJECTIVE AND OBJECTIVE BOX
Patient is a 59y old  Female who presents with a chief complaint of wound drainage from surgical site (31 Jul 2024 03:38)      SUBJECTIVE:  Patient seen and examined at bedside. Feels well, no complaints    ROS:  Denies fevers, chills, headache, vision changes, neck pain, cough, SOB, chest pain, Abdominal pain, N/V, dysuria or new rash.  All other ROS negative except as above    Vital Signs Last 12 Hrs  T(F): 98.2 (07-31-24 @ 08:07), Max: 98.2 (07-31-24 @ 08:07)  HR: 75 (07-31-24 @ 08:07) (68 - 75)  BP: 127/76 (07-31-24 @ 08:07) (126/86 - 127/76)  BP(mean): --  RR: 16 (07-31-24 @ 08:07) (16 - 18)  SpO2: 99% (07-31-24 @ 08:07) (97% - 99%)  I&O's Summary      PHYSICAL EXAM:  Constitutional: NAD, comfortable.  HEENT: no conjunctival pallor or scleral icterus, MMM  Respiratory: Normal chest rise, normal work of breathing  Extremities: wwp; no cyanosis, clubbing or edema. + LUE PICC  Neurological: AAOx3, no focal deficits        LABS:                        13.2   5.45  )-----------( 176      ( 31 Jul 2024 08:57 )             39.1     07-31    133<L>  |  97  |  11  ----------------------------<  112<H>  4.3   |  29  |  0.61    Ca    9.6      31 Jul 2024 08:57  Phos  4.5     07-31  Mg     2.0     07-31        Urinalysis Basic - ( 31 Jul 2024 08:57 )    Color: x / Appearance: x / SG: x / pH: x  Gluc: 112 mg/dL / Ketone: x  / Bili: x / Urobili: x   Blood: x / Protein: x / Nitrite: x   Leuk Esterase: x / RBC: x / WBC x   Sq Epi: x / Non Sq Epi: x / Bacteria: x          RADIOLOGY & ADDITIONAL TESTS:  No new imaging    MEDICATIONS  (STANDING):  chlorhexidine 2% Cloths 1 Application(s) Topical <User Schedule>  enoxaparin Injectable 40 milliGRAM(s) SubCutaneous every 24 hours  escitalopram 10 milliGRAM(s) Oral daily  levothyroxine 50 MICROGram(s) Oral daily  loratadine 10 milliGRAM(s) Oral daily  mupirocin 2% Ointment 1 Application(s) Topical two times a day  polyethylene glycol 3350 17 Gram(s) Oral daily  scopolamine 1 mG/72 Hr(s) Patch 1 Patch Transdermal every 72 hours  senna 2 Tablet(s) Oral at bedtime    MEDICATIONS  (PRN):  acetaminophen     Tablet .. 650 milliGRAM(s) Oral every 6 hours PRN Temp greater or equal to 38C (100.4F), Mild Pain (1 - 3)  sodium chloride 0.9% lock flush 10 milliLiter(s) IV Push every 1 hour PRN Pre/post blood products, medications, blood draw, and to maintain line patency

## 2024-08-01 ENCOUNTER — TRANSCRIPTION ENCOUNTER (OUTPATIENT)
Age: 59
End: 2024-08-01

## 2024-08-01 VITALS
HEART RATE: 81 BPM | DIASTOLIC BLOOD PRESSURE: 83 MMHG | RESPIRATION RATE: 16 BRPM | OXYGEN SATURATION: 98 % | TEMPERATURE: 98 F | SYSTOLIC BLOOD PRESSURE: 134 MMHG

## 2024-08-01 LAB
ANION GAP SERPL CALC-SCNC: 9 MMOL/L — SIGNIFICANT CHANGE UP (ref 5–17)
BUN SERPL-MCNC: 15 MG/DL — SIGNIFICANT CHANGE UP (ref 7–23)
CALCIUM SERPL-MCNC: 9.3 MG/DL — SIGNIFICANT CHANGE UP (ref 8.4–10.5)
CHLORIDE SERPL-SCNC: 100 MMOL/L — SIGNIFICANT CHANGE UP (ref 96–108)
CO2 SERPL-SCNC: 29 MMOL/L — SIGNIFICANT CHANGE UP (ref 22–31)
CREAT SERPL-MCNC: 0.69 MG/DL — SIGNIFICANT CHANGE UP (ref 0.5–1.3)
EGFR: 101 ML/MIN/1.73M2 — SIGNIFICANT CHANGE UP
GLUCOSE SERPL-MCNC: 106 MG/DL — HIGH (ref 70–99)
HCT VFR BLD CALC: 37.6 % — SIGNIFICANT CHANGE UP (ref 34.5–45)
HGB BLD-MCNC: 12.1 G/DL — SIGNIFICANT CHANGE UP (ref 11.5–15.5)
MAGNESIUM SERPL-MCNC: 2.1 MG/DL — SIGNIFICANT CHANGE UP (ref 1.6–2.6)
MCHC RBC-ENTMCNC: 31 PG — SIGNIFICANT CHANGE UP (ref 27–34)
MCHC RBC-ENTMCNC: 32.2 GM/DL — SIGNIFICANT CHANGE UP (ref 32–36)
MCV RBC AUTO: 96.4 FL — SIGNIFICANT CHANGE UP (ref 80–100)
NRBC # BLD: 0 /100 WBCS — SIGNIFICANT CHANGE UP (ref 0–0)
PHOSPHATE SERPL-MCNC: 4.5 MG/DL — SIGNIFICANT CHANGE UP (ref 2.5–4.5)
PLATELET # BLD AUTO: 176 K/UL — SIGNIFICANT CHANGE UP (ref 150–400)
POTASSIUM SERPL-MCNC: 4.4 MMOL/L — SIGNIFICANT CHANGE UP (ref 3.5–5.3)
POTASSIUM SERPL-SCNC: 4.4 MMOL/L — SIGNIFICANT CHANGE UP (ref 3.5–5.3)
RBC # BLD: 3.9 M/UL — SIGNIFICANT CHANGE UP (ref 3.8–5.2)
RBC # FLD: 11.9 % — SIGNIFICANT CHANGE UP (ref 10.3–14.5)
SODIUM SERPL-SCNC: 138 MMOL/L — SIGNIFICANT CHANGE UP (ref 135–145)
WBC # BLD: 4.65 K/UL — SIGNIFICANT CHANGE UP (ref 3.8–10.5)
WBC # FLD AUTO: 4.65 K/UL — SIGNIFICANT CHANGE UP (ref 3.8–10.5)

## 2024-08-01 PROCEDURE — 99233 SBSQ HOSP IP/OBS HIGH 50: CPT

## 2024-08-01 PROCEDURE — 99232 SBSQ HOSP IP/OBS MODERATE 35: CPT

## 2024-08-01 RX ORDER — VANCOMYCIN HYDROCHLORIDE 5 G/100ML
1.25 INJECTION, POWDER, LYOPHILIZED, FOR SOLUTION INTRAVENOUS
Qty: 0 | Refills: 0 | DISCHARGE
Start: 2024-08-01

## 2024-08-01 RX ADMIN — Medication 10 MILLIGRAM(S): at 12:20

## 2024-08-01 RX ADMIN — CHLORHEXIDINE GLUCONATE 1 APPLICATION(S): 500 CLOTH TOPICAL at 06:24

## 2024-08-01 RX ADMIN — VANCOMYCIN HYDROCHLORIDE 166.67 MILLIGRAM(S): 5 INJECTION, POWDER, LYOPHILIZED, FOR SOLUTION INTRAVENOUS at 05:13

## 2024-08-01 RX ADMIN — Medication 650 MILLIGRAM(S): at 06:31

## 2024-08-01 RX ADMIN — Medication 650 MILLIGRAM(S): at 07:31

## 2024-08-01 RX ADMIN — Medication 50 MICROGRAM(S): at 05:12

## 2024-08-01 RX ADMIN — MUPIROCIN CALCIUM 1 APPLICATION(S): 20 CREAM TOPICAL at 05:13

## 2024-08-01 NOTE — PROGRESS NOTE ADULT - PROBLEM SELECTOR PROBLEM 1
Wound dehiscence
Wound dehiscence
Drainage from wound
Wound dehiscence

## 2024-08-01 NOTE — PROGRESS NOTE ADULT - SUBJECTIVE AND OBJECTIVE BOX
Patient is a 59y old  Female who presents with a chief complaint of wound drainage from surgical site (01 Aug 2024 00:40)      SUBJECTIVE:  Patient seen and examined at bedside.  Feels well, eager for home    ROS:  Denies fevers, chills, headache, vision changes, neck pain, cough, SOB, chest pain, Abdominal pain, N/V, dysuria or new rash.  All other ROS negative except as above    Vital Signs Last 12 Hrs  T(F): 98.2 (08-01-24 @ 08:17), Max: 98.2 (08-01-24 @ 08:17)  HR: 81 (08-01-24 @ 08:17) (58 - 81)  BP: 134/83 (08-01-24 @ 08:17) (120/72 - 134/83)  BP(mean): --  RR: 16 (08-01-24 @ 08:17) (16 - 16)  SpO2: 98% (08-01-24 @ 08:17) (98% - 99%)  I&O's Summary      PHYSICAL EXAM:  Constitutional: NAD, comfortable.  HEENT: no conjunctival pallor or scleral icterus, MMM  Respiratory: Normal chest rise, normal work of breathing  Extremities: wwp; no cyanosis, clubbing or edema. + LUE PICC site c/d/i  Neurological: AAOx3, no focal deficits  Skin: Neck surgical site c/d/i, no drainage        LABS:                        12.1   4.65  )-----------( 176      ( 01 Aug 2024 06:47 )             37.6     08-01    138  |  100  |  15  ----------------------------<  106<H>  4.4   |  29  |  0.69    Ca    9.3      01 Aug 2024 06:47  Phos  4.5     08-01  Mg     2.1     08-01        Urinalysis Basic - ( 01 Aug 2024 06:47 )    Color: x / Appearance: x / SG: x / pH: x  Gluc: 106 mg/dL / Ketone: x  / Bili: x / Urobili: x   Blood: x / Protein: x / Nitrite: x   Leuk Esterase: x / RBC: x / WBC x   Sq Epi: x / Non Sq Epi: x / Bacteria: x          RADIOLOGY & ADDITIONAL TESTS:  No new imaging    MEDICATIONS  (STANDING):  chlorhexidine 2% Cloths 1 Application(s) Topical <User Schedule>  enoxaparin Injectable 40 milliGRAM(s) SubCutaneous every 24 hours  escitalopram 10 milliGRAM(s) Oral daily  levothyroxine 50 MICROGram(s) Oral daily  loratadine 10 milliGRAM(s) Oral daily  mupirocin 2% Ointment 1 Application(s) Topical two times a day  polyethylene glycol 3350 17 Gram(s) Oral daily  scopolamine 1 mG/72 Hr(s) Patch 1 Patch Transdermal every 72 hours  senna 2 Tablet(s) Oral at bedtime  vancomycin  IVPB 1250 milliGRAM(s) IV Intermittent every 12 hours    MEDICATIONS  (PRN):  acetaminophen     Tablet .. 650 milliGRAM(s) Oral every 6 hours PRN Temp greater or equal to 38C (100.4F), Mild Pain (1 - 3)  sodium chloride 0.9% lock flush 10 milliLiter(s) IV Push every 1 hour PRN Pre/post blood products, medications, blood draw, and to maintain line patency

## 2024-08-01 NOTE — PROGRESS NOTE ADULT - NS ATTEND AMEND GEN_ALL_CORE FT
Agree with above.  Patient doing well on IV Vancomycin.  Check trough today at 5 pm (goal 13-18).  Will need a 10 day course of antibiotics (ends 8/5/24).  Ok to place PICC
Agree with above.  Patient has corynebacterium species on two separate OR cultures.  Given that shows "numerous" colonies and that it is in 2 separate cultures from the OR, will need to treat.  Corynebacterium can be resistant to a lot of antibiotics.  Susceptibiltiies are not available here and are a send out.  Only reliable options are IV Vancomycin and Linezolid.  PO linezolid would have been a good option however she is on Lexapro which can create a serious drug interaction with linezolid (serotonin syndrome).  Safest course of active would be Vancomycin 750 mg IV q12 x 10 days.  Recommend trough before 4th dose.
Agree with above.  Vancomycin trough not done prior to AM dose.  Can continue Vancomycin 1250 mg IV q12.  Check trough around 4:30 - 5 pm tonight.  I would be ok with 10-16 range given that it's superficial.  She does seem to be responding.  Ok to discharge once therapeutic vancomycin obtained.  End date is 8/8/24.  ID team 2 will follow.  Dr. Contreras will take over the service on 8/1/24
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 Surrounding signs of SSTI resolved. Tolerating abx. Discharge today to complete 2 weeks of vancomycin at home (EOT 8/8).
Patient is s/p PICC placement.  Vanco level higher but still low.  Agree with increase to 1250 mg IV q12 of vancomycin.  Needs 10 day course.  GIven low troughs would start course on 7/29/24 - 8/8/2024.  Check repeat trough on 7/31 in AM

## 2024-08-01 NOTE — DISCHARGE NOTE NURSING/CASE MANAGEMENT/SOCIAL WORK - PATIENT PORTAL LINK FT
You can access the FollowMyHealth Patient Portal offered by [facility] by registering at the following website: [email or web address]. By joining [facility]’s FollowMyHealth portal, you will also be able to view your health information using other applications (apps) compatible with our system.

## 2024-08-01 NOTE — PROGRESS NOTE ADULT - PROBLEM SELECTOR PLAN 5
continue lexapro   hold Valium for now
- continue lexapro   - hold Valium for now
- continue lexapro   - hold Valium for now
continue lexapro   hold Valium for now
continue lexapro   hold Valium for now
- continue lexapro   - hold Valium for now
- continue lexapro   - hold Valium for now
continue lexapro   hold Valium for now

## 2024-08-01 NOTE — DISCHARGE NOTE NURSING/CASE MANAGEMENT/SOCIAL WORK - NSDCPEFALRISK_GEN_ALL_CORE
For information on Fall & Injury Prevention, visit: [email or web address] OR  [email or web address] OR  https://www.cdc.gov/steadi/patient.html

## 2024-08-01 NOTE — PROGRESS NOTE ADULT - PROBLEM SELECTOR PROBLEM 3
Hypothyroidism
MVP (mitral valve prolapse)
Hypothyroidism

## 2024-08-01 NOTE — PROGRESS NOTE ADULT - PROBLEM SELECTOR PROBLEM 5
Anxiety and depression

## 2024-08-01 NOTE — PROGRESS NOTE ADULT - NS ATTEST RISK PROBLEM GEN_ALL_CORE FT
Surgical site infection with wound dehiscence  Corynebacterium infection  Drug toxicity of Vancomycin monitoring

## 2024-08-01 NOTE — PROGRESS NOTE ADULT - PROBLEM SELECTOR PROBLEM 4
MVP (mitral valve prolapse)
Pre-op exam
MVP (mitral valve prolapse)
MVP (mitral valve prolapse)

## 2024-08-01 NOTE — PROGRESS NOTE ADULT - REASON FOR ADMISSION
wound drainage from surgical site

## 2024-08-01 NOTE — PROGRESS NOTE ADULT - PROBLEM SELECTOR PLAN 2
-see above
-Synthroid
-see above

## 2024-08-01 NOTE — PROGRESS NOTE ADULT - PROBLEM SELECTOR PROBLEM 2
Drainage from wound
Hypothyroidism
Drainage from wound

## 2024-08-01 NOTE — DISCHARGE NOTE NURSING/CASE MANAGEMENT/SOCIAL WORK - NSDCFUADDAPPT_GEN_ALL_CORE_FT
Please follow up with Dr. Camp and Dr. Mcbride in the outpatient office on 8/13 at 1:00PM. Call [phone] to confirm appointment date and time.     Please follow up with Infectious Disease. Patient to follow up with Dr. Lucio in 2 weeks ([address] [place] [address], [phone]), ID office will call patient to schedule.     Please also follow up with your Primary Care Doctor.

## 2024-08-01 NOTE — PROGRESS NOTE ADULT - SUBJECTIVE AND OBJECTIVE BOX
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 Mcbride's outpatient office with complaints of wound drainage from surgical site incision that has started on Friday. Patient denies headaches, nausea/vomiting, lightheadedness, dizziness, fevers, and chills. Patient denies pain to the surgical site. Admitted to neurosurgery for wound drainage from surgical site incision.  (2024 18:27)      Subjective:  Patient denies any acute complaints.     Hospital course:  : admit to neurosurgery for wound drainge from surgical site. MRI head w/ and w/o orderd. Infectious workup ordered (ESR/CRP/Blood cultures)   : NAZIA ovn, OR today, POD 0 excisional debridement of wound of scalp including bone  : POD 1, NAZIA ovn.   : POD 2, NAZIA overnight. OR cxs show NGTD. ID consulted. OR cxs growing staph epi and corynebacterium bovis  : POD 3, NAZIA overnight. Started vancomycin 750mg q12 per ID recs. Ancef dc'd. SQL tonight. Dressing changes BID with mupirocin.  : POD4, NAZIA overnight  : POD5, NAZIA overnight, Vancomycin increased to 1g q12 due to subtherapeutic vanc trough.   : POD 6. NAZIA overnight, neuro stable. pending PICC. PICC placed. Vanco trough subtherapeutic, dose inc to 1250 BID.   : POD 7. NAZIA overnight.   : POD8. Morning vanc trough 26.6, rpt pending. Repeat vanc trough 11.9 (new goal 10-16).   : POD9, NAZIA overnight      Vital Signs Last 24 Hrs  T(C): 36.6 (2024 20:48), Max: 36.8 (2024 08:07)  T(F): 97.8 (2024 20:48), Max: 98.2 (2024 08:07)  HR: 85 (2024 20:48) (62 - 85)  BP: 122/74 (2024 20:48) (122/74 - 129/75)  BP(mean): --  RR: 16 (2024 20:48) (16 - 18)  SpO2: 96% (2024 20:48) (96% - 99%)    Parameters below as of 2024 20:48  Patient On (Oxygen Delivery Method): room air        I&O's Summary      PHYSICAL EXAM:  General: patient seen laying supine in bed in NAD  Neuro: AAOx3, follows commands, opens eyes spontaneously, speech clear and fluent, CNII-XI grossly intact, face symmetric, no pronator drift,  strength 5/5 b/l upper extremities and lower extremities, sensation intact to light touch throughout  HEENT: PERRL, EOMI  Neck: supple  Cardiac: RRR, S1S2  Pulmonary: chest rise symmetric  Abdomen: soft, nontender, nondistended  Ext: perfusing well  Skin: warm, dry  Wound: L far lateral crani incision c/d/i with sutures in place    LABS:                        13.2   5.45  )-----------( 176      ( 2024 08:57 )             39.1         133<L>  |  97  |  11  ----------------------------<  112<H>  4.3   |  29  |  0.61    Ca    9.6      2024 08:57  Phos  4.5       Mg     2.0             Urinalysis Basic - ( 2024 08:57 )    Color: x / Appearance: x / SG: x / pH: x  Gluc: 112 mg/dL / Ketone: x  / Bili: x / Urobili: x   Blood: x / Protein: x / Nitrite: x   Leuk Esterase: x / RBC: x / WBC x   Sq Epi: x / Non Sq Epi: x / Bacteria: x          CAPILLARY BLOOD GLUCOSE          Drug Levels: [] N/A  Vancomycin Level, Trough: 11.9 ug/mL ( @ 17:05)  Vancomycin Level, Trough: 26.9 ug/mL ( @ 08:57)  Vancomycin Level, Trough: 7.7 ug/mL ( @ 16:56)    CSF Analysis: [] N/A      Allergies    Decadron (Flushing)  gabapentin (Rash)  bacitracin topical (Rash (Mild))  strawberry (Unknown)  latex (Flushing)  tetracycline (Flushing)  cefuroxime (Flushing)  Zithromax (Flushing)    Intolerances      MEDICATIONS:  Antibiotics:  vancomycin  IVPB 1250 milliGRAM(s) IV Intermittent every 12 hours    Neuro:  acetaminophen     Tablet .. 650 milliGRAM(s) Oral every 6 hours PRN  escitalopram 10 milliGRAM(s) Oral daily  scopolamine 1 mG/72 Hr(s) Patch 1 Patch Transdermal every 72 hours    Anticoagulation:  enoxaparin Injectable 40 milliGRAM(s) SubCutaneous every 24 hours    OTHER:  chlorhexidine 2% Cloths 1 Application(s) Topical <User Schedule>  levothyroxine 50 MICROGram(s) Oral daily  loratadine 10 milliGRAM(s) Oral daily  mupirocin 2% Ointment 1 Application(s) Topical two times a day  polyethylene glycol 3350 17 Gram(s) Oral daily  senna 2 Tablet(s) Oral at bedtime    IVF:    CULTURES:  Culture Results:   Rare Staphylococcus epidermidis "Susceptibilities not performed"  Few Corynebacterium bovis "Susceptibilities not performed" ( @ 17:00)  Culture Results:   Numerous Corynebacterium bovis ( @ 17:00)    RADIOLOGY & ADDITIONAL TESTS:    POST-OPERATIVECOMPLICATION    Encounter for follow-up examination after completed treatment for conditions other than malignant neoplasm    Encounter for other specified aftercare    Handoff    MEWS Score    MVP (mitral valve prolapse)    Atypical facial pain    Eczema    H/O headache    Osteoarthritis    H/O bone cyst    Hypothyroidism    Endometriosis    Wound dehiscence    Wound dehiscence    Excisional debridement of wound of scalp including bone    Post-operative complication    Drainage from wound    Drainage from wound    Hypothyroidism    MVP (mitral valve prolapse)    Pre-op exam    Anxiety and depression    Wound dehiscence    Excisional debridement of wound of scalp including bone    H/O myomectomy    Atypical facial pain    H/O eczema    H/O headache    H/O: osteoarthritis    H/O bone cyst    H/O carpal tunnel repair     product of IVF pregnancy    WOUND CHECK    31    Hypothyroidism    Anxiety and depression    MVP (mitral valve prolapse)    Osteoarthritis    History of eczema    [email or web address]        ASSESSMENT:  60 y/o female PMH hypothyroidism, depression, s/p left craniotomy, exploration of foramen magnum, 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 Mcbride's outpatient office with complaints of wound drainage from surgical site incision that has started on Friday. Patient denies headaches, nausea/vomiting, lightheadedness, dizziness, fevers, and chills. Patient denies pain to the surgical site. Admitted to to [facility] for further management. Now s/p excisional debridement of wound of scalp including bone ().    Neuro  - neuro/vitals q4  - MRI head w/ and w/o done  - Anxiety/depression: lexapro 10mg qd    Cardiology   - SBP <160    Respiratory   - RA, IS    GI   - regular diet  - bowel regimen, last BM     Renal   - IVL, voiding    Endocrine   - a1c 5.4   - hx of hypothyroidism, c/w synthroid 50 mcg daily.     ID  - afebrile, (+) PICC   - Vancomycin 1250mg BID (-)  - OR cx from : growing cornyebacterium, staph epi  - ESR/CRP negative  - blood cultures NGTD     Heme  - dvt ppx: SCDs, SQL    Misc  - mupirocin and xeroform dressing changes BID    Dispo: regional, full code    Case discussed with Dr. Mcbride and Dr. Camp        Assessment:  Present when checked    []  GCS  E   V  M     Heart Failure: []Acute, [] acute on chronic , []chronic  Heart Failure:  [] Diastolic (HFpEF), [] Systolic (HFrEF), []Combined (HFpEF and HFrEF), [] RHF, [] Pulm HTN, [] Other    [] JOHN, [] ATN, [] AIN, [] other  [] CKD1, [] CKD2, [] CKD 3, [] CKD 4, [] CKD 5, []ESRD    Encephalopathy: [] Metabolic, [] Hepatic, [] toxic, [] Neurological, [] Other    Abnormal Nurtitional Status: [] malnurtition (see nutrition note), [ ]underweight: BMI < 19, [] morbid obesity: BMI >40, [] Cachexia    [] Sepsis  [] hypovolemic shock,[] cardiogenic shock, [] hemorrhagic shock, [] neuogenic shock  [] Acute Respiratory Failure  []Cerebral edema, [] Brain compression/ herniation,   [] Functional quadriplegia  [] Acute blood loss anemia

## 2024-08-01 NOTE — PROGRESS NOTE ADULT - PROBLEM SELECTOR PLAN 1
OR culture with Corynebacterium  - topical mupirocin instead of bacitracin (per patient , gets rash with non-mupirocin topical abx)  - Vancomycin per ID for 10 days, do not count subtherapeutic days  - s/p PICC  - f/u vanc trough tomorrow, was subtherapeutic yesterday
-Plan for OR with NSG today  -Pre-Op Eval below  -If possible sterile cultures from OR if there is any drainage
OR culture with Corynebacterium  - topical mupirocin instead of bacitracin (per patient , gets rash with non-mupirocin topical abx)  - Vancomycin per ID for 10 days  - plan for PICC  - f/u vanc trough this evening
OR culture with Corynebacterium  - topical mupirocin instead of bacitracin (per patient , gets rash with non-mupirocin topical abx)  - Vancomycin per ID for 10 days, do not count subtherapeutic days  - s/p PICC  - f/u vanc trough, this AM was drawn after abx dose, will need a random level prior to 5:30PM dose.  DC Vanc order until trough drawn
F/U culture from OR  agree with holding ID pending culture results
F/U culture from OR  topical mupirocin instead of bacitracin (per patient , gets rash with non-mupirocin topical abx)  Vancomycin    as an aside, Serotonin syndrome was likely over reported in initial Linezolid studies and given short duration would be a viable option, however, without full literature support do agree IV Vancomycin is safer
OR culture with Corynebacterium  - topical mupirocin instead of bacitracin (per patient , gets rash with non-mupirocin topical abx)  - Vancomycin per ID for 10 days, do not count subtherapeutic days.  EOT 8/8  - s/p PICC  - vanc trough at goal for superficial infection.  Follow up with Dr Lucio
F/U culture from OR  topical mupirocin instead of bacitracin (per patient , gets rash with non-mupirocin topical abx)  Vancomycin    Discussion with ID Pharmacist/ID if possible to do once a day dosing as patient wishes to go to infusion center.     as an aside, Serotonin syndrome was likely over reported in initial Linezolid studies and given short duration would be a viable option, however, without full literature support do agree IV Vancomycin is safer
F/U culture from OR  topical mupirocin instead of bacitracin (per patient , gets rash with non-mupirocin topical abx)  Vancomycin    Given low trough, unlikely to be therapeutic with once day dosing, would increase Vanc dose. Discussion with ID    as an aside, Serotonin syndrome was likely over reported in initial Linezolid studies and given short duration would be a viable option, however, without full literature support do agree IV Vancomycin is safer
F/U culture from OR  agree with ID recs to follow up on pending culture results  topical mupirocin instead of bacitracin (per patient , gets rash with non-mupirocin topical abx)

## 2024-08-01 NOTE — PROGRESS NOTE ADULT - ASSESSMENT
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 Mcbride's outpatient office with complaints of wound drainage found to have wound dehiscence s/p Excisional debridement of wound of scalp including bone 7/23. Per NSGY, all superficial, low concern for OM. Patient is stable and well appearing- afebrile, no leukocytosis, ESR/CRP wnl. OR cultures 7/23 -  Corynebacterium grew in both superficial cultures; staph epi also grew in one of the superficial cultures- likely contaminant but will also be covered by regimen. Deep culture grew Corynebacterium and cutibacterium acnes. Corynebacterium is typically a very resistant organism with few antibiotic options- cannot use Doxycycline (tetracycline allergy) or Linezolid (interacts with Lexapro- serotonin syndrome). Best option for patient is vancomycin- d/w patient, she is agreeable with plan and is willing to stay until therapeutic vanc trough is obtained. PICC placed 7/29    bcxs 7/22 no growth (final)  trough last evening appropriately timed and therapeutic at 11.9. Patient ok for discharge from ID perspective.      Suggest:  -continue vancomycin 1250mg IV Q12.   -anticipate 10 days of therapeutic IV Vancomycin (7/30 - 8/8/24). First couple days of vancomycin was at subtherapeutic trough of 4 so would not count those days towards course.   -Weekly labs: CMP, CBC with diff, vanc trough faxed to ID office at [phone]  -Patient to follow up with Dr. Lucio in 2 weeks ([address] [place] [address], [phone]), ID office will call patient to schedule     Team 2 will sign off. Thank you for your consultation   Please reconsult with questions.     Case d/w primary team.  Final recommendation pending attending note.    Vandana Stanford, Infectious Diseases PA  Please reach out for any questions 9 am-5pm.   For evenings and weekends, please call the ID physician on call. 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 Mcbride's outpatient office with complaints of wound drainage found to have wound dehiscence s/p Excisional debridement of wound of scalp including bone 7/23. Per NSGY, all superficial, low concern for OM. Patient is stable and well appearing- afebrile, no leukocytosis, ESR/CRP wnl. OR cultures 7/23 -  Corynebacterium grew in both superficial cultures; staph epi also grew in one of the superficial cultures- likely contaminant but will also be covered by regimen. Deep culture grew Corynebacterium and cutibacterium acnes. Corynebacterium is typically a very resistant organism with few antibiotic options- cannot use Doxycycline (tetracycline allergy) and prefer not to use Linezolid (due to concurrent SSRI). Best option for patient is vancomycin- d/w patient, she is agreeable with plan and is willing to stay until therapeutic vanc trough is obtained. PICC placed 7/29    bcxs 7/22 no growth (final)  trough last evening appropriately timed and therapeutic at 11.9. Patient ok for discharge from ID perspective.      Suggest:  -continue vancomycin 1250mg IV Q12.   -anticipate 10 days of therapeutic IV Vancomycin (7/30 - 8/8/24). First couple days of vancomycin was at subtherapeutic trough of 4 so would not count those days towards course.   -Weekly labs: CMP, CBC with diff, vanc trough faxed to ID office at [phone]  -Patient to follow up with Dr. Lucio in 2 weeks ([address] [place] [address], [phone]), ID office will call patient to schedule     Team 2 will sign off. Thank you for your consultation   Please reconsult with questions.     Case d/w primary team.  Final recommendation pending attending note.    Vandana Stanford, Infectious Diseases PA  Please reach out for any questions 9 am-5pm.   For evenings and weekends, please call the ID physician on call.

## 2024-08-01 NOTE — PROGRESS NOTE ADULT - PROBLEM SELECTOR PLAN 4
-No acute interventions needed  -Murmur heard on exam consistent
RCRI 0, Class I, 3.9% CV Risk  NSQIP below national average risk  METS > 4    Optimized for planned procedure    Anesthesia to be aware patient has experienced liable blood pressures during induction (family reports significant elevations)  Anesthesia to be aware that patient has significant post procedural nausea
-No acute interventions needed  -Murmur heard on exam consistent

## 2024-08-01 NOTE — PROGRESS NOTE ADULT - TIME BILLING
Review of hospital course, labs, vitals, medical records.   Bedside exam and interview   Discussed plan of care with primary team ACP   Documenting the encounter  Excludes separately reported services
Review of hospital course, labs, vitals, medical records.   Bedside exam and interview   Discussed plan of care with primary team ACP   Documenting the encounter  Excludes separately reported services  Includes dosing of antibiotics based on level
Review of hospital course, labs, vitals, medical records.   Bedside exam and interview   Discussed plan of care with primary team ACP and housestaff   Documenting the encounter  Pre-operative assessment
treatment of superficial wound infection
treatment of surgical wound infection
treatment of cellulitis
Managing SSTI
Review of hospital course, labs, vitals, medical records.   Bedside exam and interview   Discussed plan of care with primary team ACP   Documenting the encounter  Excludes separately reported services  Includes dosing of antibiotics based on level
Review of hospital course, labs, vitals, medical records.   Bedside exam and interview   Discussed plan of care with primary team ACP   Documenting the encounter  Excludes separately reported services
treatment of surgical site infection
treatment of superficial wound infection
treatment of surgical wound infection
Review of hospital course, labs, vitals, medical records.   Bedside exam and interview   Discussed plan of care with primary team ACP and housestaff   Documenting the encounter  Pre-operative assessment
Review of hospital course, labs, vitals, medical records.   Bedside exam and interview   Discussed plan of care with primary team ACP   Documenting the encounter  Excludes separately reported services

## 2024-08-01 NOTE — PROGRESS NOTE ADULT - PROVIDER SPECIALTY LIST ADULT
Infectious Disease
Neurosurgery
Hospitalist
Infectious Disease
Neurosurgery
Infectious Disease
Infectious Disease
Neurosurgery
Infectious Disease
Neurosurgery
Hospitalist
None

## 2024-08-01 NOTE — PROGRESS NOTE ADULT - SUBJECTIVE AND OBJECTIVE BOX
INFECTIOUS DISEASES CONSULT FOLLOW-UP NOTE    INTERVAL HPI/OVERNIGHT EVENTS:    Patient seen and examined at bedside. NAZIA. Afebrile.       ROS:   Constitutional, eyes, ENT, cardiovascular, respiratory, gastrointestinal, genitourinary, integumentary, neurological, psychiatric and heme/lymph are otherwise negative other than noted above       ANTIBIOTICS/RELEVANT:    MEDICATIONS  (STANDING):  chlorhexidine 2% Cloths 1 Application(s) Topical <User Schedule>  enoxaparin Injectable 40 milliGRAM(s) SubCutaneous every 24 hours  escitalopram 10 milliGRAM(s) Oral daily  levothyroxine 50 MICROGram(s) Oral daily  loratadine 10 milliGRAM(s) Oral daily  mupirocin 2% Ointment 1 Application(s) Topical two times a day  polyethylene glycol 3350 17 Gram(s) Oral daily  scopolamine 1 mG/72 Hr(s) Patch 1 Patch Transdermal every 72 hours  senna 2 Tablet(s) Oral at bedtime  vancomycin  IVPB 1250 milliGRAM(s) IV Intermittent every 12 hours    MEDICATIONS  (PRN):  acetaminophen     Tablet .. 650 milliGRAM(s) Oral every 6 hours PRN Temp greater or equal to 38C (100.4F), Mild Pain (1 - 3)  sodium chloride 0.9% lock flush 10 milliLiter(s) IV Push every 1 hour PRN Pre/post blood products, medications, blood draw, and to maintain line patency        Vital Signs Last 24 Hrs  T(C): 36.8 (01 Aug 2024 08:17), Max: 36.8 (01 Aug 2024 08:17)  T(F): 98.2 (01 Aug 2024 08:17), Max: 98.2 (01 Aug 2024 08:17)  HR: 81 (01 Aug 2024 08:17) (58 - 85)  BP: 134/83 (01 Aug 2024 08:17) (120/72 - 134/83)  BP(mean): --  RR: 16 (01 Aug 2024 08:17) (16 - 16)  SpO2: 98% (01 Aug 2024 08:17) (96% - 99%)    Parameters below as of 01 Aug 2024 08:17  Patient On (Oxygen Delivery Method): room air        PHYSICAL EXAM:  Constitutional: alert, NAD  Eyes: the sclera and conjunctiva were normal.   Head: Wound noted L posterior base of neck. not actively draining. less surrounding erythema today  ENT: the ears and nose were normal in appearance.   Neck: the appearance of the neck was normal and the neck was supple.   Pulmonary: no respiratory distress . Symmetric chest rise.    Heart: heart rate was normal and rhythm regular, normal S1 and S2  Vascular:. there was no peripheral edema  Abdomen: normal bowel sounds, soft, non-tender  Neurological: no focal deficits.   Psychiatric: the affect was normal      LABS:                        12.1   4.65  )-----------( 176      ( 01 Aug 2024 06:47 )             37.6     08-01    138  |  100  |  15  ----------------------------<  106<H>  4.4   |  29  |  0.69    Ca    9.3      01 Aug 2024 06:47  Phos  4.5     08-01  Mg     2.1     08-01        Urinalysis Basic - ( 01 Aug 2024 06:47 )    Color: x / Appearance: x / SG: x / pH: x  Gluc: 106 mg/dL / Ketone: x  / Bili: x / Urobili: x   Blood: x / Protein: x / Nitrite: x   Leuk Esterase: x / RBC: x / WBC x   Sq Epi: x / Non Sq Epi: x / Bacteria: x        MICROBIOLOGY:      RADIOLOGY & ADDITIONAL STUDIES:  Reviewed INFECTIOUS DISEASES CONSULT FOLLOW-UP NOTE    INTERVAL HPI/OVERNIGHT EVENTS:    Patient seen and examined at bedside. NAZIA. Afebrile.       ROS:   Constitutional, eyes, ENT, cardiovascular, respiratory, gastrointestinal, genitourinary, integumentary, neurological, psychiatric and heme/lymph are otherwise negative other than noted above       ANTIBIOTICS/RELEVANT:    MEDICATIONS  (STANDING):  chlorhexidine 2% Cloths 1 Application(s) Topical <User Schedule>  enoxaparin Injectable 40 milliGRAM(s) SubCutaneous every 24 hours  escitalopram 10 milliGRAM(s) Oral daily  levothyroxine 50 MICROGram(s) Oral daily  loratadine 10 milliGRAM(s) Oral daily  mupirocin 2% Ointment 1 Application(s) Topical two times a day  polyethylene glycol 3350 17 Gram(s) Oral daily  scopolamine 1 mG/72 Hr(s) Patch 1 Patch Transdermal every 72 hours  senna 2 Tablet(s) Oral at bedtime  vancomycin  IVPB 1250 milliGRAM(s) IV Intermittent every 12 hours    MEDICATIONS  (PRN):  acetaminophen     Tablet .. 650 milliGRAM(s) Oral every 6 hours PRN Temp greater or equal to 38C (100.4F), Mild Pain (1 - 3)  sodium chloride 0.9% lock flush 10 milliLiter(s) IV Push every 1 hour PRN Pre/post blood products, medications, blood draw, and to maintain line patency        Vital Signs Last 24 Hrs  T(C): 36.8 (01 Aug 2024 08:17), Max: 36.8 (01 Aug 2024 08:17)  T(F): 98.2 (01 Aug 2024 08:17), Max: 98.2 (01 Aug 2024 08:17)  HR: 81 (01 Aug 2024 08:17) (58 - 85)  BP: 134/83 (01 Aug 2024 08:17) (120/72 - 134/83)  BP(mean): --  RR: 16 (01 Aug 2024 08:17) (16 - 16)  SpO2: 98% (01 Aug 2024 08:17) (96% - 99%)    Parameters below as of 01 Aug 2024 08:17  Patient On (Oxygen Delivery Method): room air        PHYSICAL EXAM:  Constitutional: alert, NAD  Eyes: the sclera and conjunctiva were normal.   Head: Wound noted L posterior base of neck. not actively draining. Resolved surrounding erythema  ENT: the ears and nose were normal in appearance.   Neck: the appearance of the neck was normal and the neck was supple.   Pulmonary: no respiratory distress . Symmetric chest rise.    Heart: heart rate was normal and rhythm regular, normal S1 and S2  Vascular:. there was no peripheral edema  Abdomen: normal bowel sounds, soft, non-tender  Neurological: no focal deficits.   Psychiatric: the affect was normal      LABS:                        12.1   4.65  )-----------( 176      ( 01 Aug 2024 06:47 )             37.6     08-01    138  |  100  |  15  ----------------------------<  106<H>  4.4   |  29  |  0.69    Ca    9.3      01 Aug 2024 06:47  Phos  4.5     08-01  Mg     2.1     08-01        Urinalysis Basic - ( 01 Aug 2024 06:47 )    Color: x / Appearance: x / SG: x / pH: x  Gluc: 106 mg/dL / Ketone: x  / Bili: x / Urobili: x   Blood: x / Protein: x / Nitrite: x   Leuk Esterase: x / RBC: x / WBC x   Sq Epi: x / Non Sq Epi: x / Bacteria: x        MICROBIOLOGY:  Reviewed    RADIOLOGY & ADDITIONAL STUDIES:  Reviewed

## 2024-08-05 LAB — CULTURE RESULTS: ABNORMAL

## 2024-08-05 PROCEDURE — 93005 ELECTROCARDIOGRAM TRACING: CPT

## 2024-08-05 PROCEDURE — 83735 ASSAY OF MAGNESIUM: CPT

## 2024-08-05 PROCEDURE — 86901 BLOOD TYPING SEROLOGIC RH(D): CPT

## 2024-08-05 PROCEDURE — C1889: CPT

## 2024-08-05 PROCEDURE — 85610 PROTHROMBIN TIME: CPT

## 2024-08-05 PROCEDURE — A9585: CPT

## 2024-08-05 PROCEDURE — 86140 C-REACTIVE PROTEIN: CPT

## 2024-08-05 PROCEDURE — 80053 COMPREHEN METABOLIC PANEL: CPT

## 2024-08-05 PROCEDURE — 36415 COLL VENOUS BLD VENIPUNCTURE: CPT

## 2024-08-05 PROCEDURE — 87070 CULTURE OTHR SPECIMN AEROBIC: CPT

## 2024-08-05 PROCEDURE — 83605 ASSAY OF LACTIC ACID: CPT

## 2024-08-05 PROCEDURE — 87040 BLOOD CULTURE FOR BACTERIA: CPT

## 2024-08-05 PROCEDURE — 85025 COMPLETE CBC W/AUTO DIFF WBC: CPT

## 2024-08-05 PROCEDURE — 99285 EMERGENCY DEPT VISIT HI MDM: CPT

## 2024-08-05 PROCEDURE — 86850 RBC ANTIBODY SCREEN: CPT

## 2024-08-05 PROCEDURE — 85027 COMPLETE CBC AUTOMATED: CPT

## 2024-08-05 PROCEDURE — 87077 CULTURE AEROBIC IDENTIFY: CPT

## 2024-08-05 PROCEDURE — 85652 RBC SED RATE AUTOMATED: CPT

## 2024-08-05 PROCEDURE — 70553 MRI BRAIN STEM W/O & W/DYE: CPT | Mod: MC

## 2024-08-05 PROCEDURE — 81025 URINE PREGNANCY TEST: CPT

## 2024-08-05 PROCEDURE — 87075 CULTR BACTERIA EXCEPT BLOOD: CPT

## 2024-08-05 PROCEDURE — 85730 THROMBOPLASTIN TIME PARTIAL: CPT

## 2024-08-05 PROCEDURE — 80048 BASIC METABOLIC PNL TOTAL CA: CPT

## 2024-08-05 PROCEDURE — 86900 BLOOD TYPING SEROLOGIC ABO: CPT

## 2024-08-05 PROCEDURE — 80202 ASSAY OF VANCOMYCIN: CPT

## 2024-08-05 PROCEDURE — 84100 ASSAY OF PHOSPHORUS: CPT

## 2024-08-13 ENCOUNTER — APPOINTMENT (OUTPATIENT)
Dept: NEUROSURGERY | Facility: CLINIC | Age: 59
End: 2024-08-13
Payer: COMMERCIAL

## 2024-08-13 VITALS
HEART RATE: 68 BPM | RESPIRATION RATE: 18 BRPM | HEIGHT: 64 IN | TEMPERATURE: 98 F | OXYGEN SATURATION: 98 % | DIASTOLIC BLOOD PRESSURE: 75 MMHG | BODY MASS INDEX: 21.51 KG/M2 | WEIGHT: 126 LBS | SYSTOLIC BLOOD PRESSURE: 127 MMHG

## 2024-08-13 DIAGNOSIS — Z48.02 ENCOUNTER FOR REMOVAL OF SUTURES: ICD-10-CM

## 2024-08-13 DIAGNOSIS — Z98.890 OTHER SPECIFIED POSTPROCEDURAL STATES: ICD-10-CM

## 2024-08-13 DIAGNOSIS — Z51.89 ENCOUNTER FOR OTHER SPECIFIED AFTERCARE: ICD-10-CM

## 2024-08-13 LAB — MISCELLANEOUS TEST NAME: SIGNIFICANT CHANGE UP

## 2024-08-13 PROCEDURE — 99024 POSTOP FOLLOW-UP VISIT: CPT

## 2024-08-13 RX ORDER — LORATADINE 10 MG
TABLET,CHEWABLE ORAL
Refills: 0 | Status: ACTIVE | COMMUNITY

## 2024-08-13 NOTE — CHART NOTE - NSCHARTNOTEFT_GEN_A_CORE
depth of the tissue excised during the debridement performed on 07/23/2024: Muscle, fascia, subcutaneous tissue down to the skull

## 2024-08-14 ENCOUNTER — APPOINTMENT (OUTPATIENT)
Dept: INFECTIOUS DISEASE | Facility: CLINIC | Age: 59
End: 2024-08-14

## 2024-08-14 ENCOUNTER — APPOINTMENT (OUTPATIENT)
Dept: INFECTIOUS DISEASE | Facility: CLINIC | Age: 59
End: 2024-08-14
Payer: COMMERCIAL

## 2024-08-14 DIAGNOSIS — T81.49XA INFECTION FOLLOWING A PROCEDURE, OTHER SURGICAL SITE, INITIAL ENCOUNTER: ICD-10-CM

## 2024-08-14 PROCEDURE — 99213 OFFICE O/P EST LOW 20 MIN: CPT

## 2024-08-14 NOTE — HISTORY OF PRESENT ILLNESS
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 Mcbride's outpatient office with complaints of wound drainage found to have wound dehiscence s/p Excisional debridement of wound of scalp including bone 7/23. Per NSGY, all superficial, low concern for OM.   OR cultures showed Corynebacterium both superficial cultures; staph epi also grew in one of the superficial cultures- likely contaminant but will also be covered by regimen. Deep culture grew Corynebacterium and cutibacterium acnes.  Given that she was on SSRI, linezolid was not recommended.  It was recommended she complete a 10 day course of IV Vancomycin which she completed on 8/8/24.  She tolerated the antibiotic well and was compliant with dosing.  She reports the wound is healing well.  No drainage, pain, dehiscence or erythema

## 2024-08-14 NOTE — REASON FOR VISIT
[Home] : at home, [unfilled] , at the time of the visit. [Medical Office: ([facility])___] : at the medical office located in

## 2024-08-20 NOTE — REASON FOR VISIT
[de-identified] : excisional debridement of wound of scalp including bone [de-identified] : 7/23/24 [de-identified] : 21

## 2024-08-20 NOTE — REASON FOR VISIT
[de-identified] : excisional debridement of wound of scalp including bone [de-identified] : 7/23/24 [de-identified] : 21

## 2024-08-20 NOTE — HISTORY OF PRESENT ILLNESS
[FreeTextEntry1] : 58 yo female w. history of left sided HA involving her left ear on MRI found to have a T2 hyperintense lesion, foramen magnum extending down, rim enhancement, abutting the left vertebral artery and distorting the 12th nerve.   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 Mcbride.  PATH c/w minute fragments of acellular material with rare benign squamous cells.  Had sutures removed on 7/10/24. Presented to clinic on 7/22/24 with c/o drainage from surgical incision that started on 7/19/24. Sent to ER.  Now s/p excisional debridement of wound of scalp including bone on 7/23/24. OR culture showing rare staphylococcus epidermidis, corynebacterium bovis, and cutibacterium  Completed 2 week course of IV vancomycin on 8/8/24.   8/13/24: Returns today for routine post op f/u. Feeling well, back to baseline. Sleeping ok, exercising with light walking. Denies fevers, chills, drainage.    Scalp: Head shape appears symmetrical. Nylon sutures in place along incision. Scalp incision sites are healing appropriately without erythema, dehiscence, drainage, or signs of infection. Edges are well-approximated. No other erythema. No fluctuance or palpable fluid collections. A&Ox3

## 2024-08-20 NOTE — ASSESSMENT
[FreeTextEntry1] : 58 yo female w. history of left sided HA involving her left ear on MRI found to have a T2 hyperintense lesion, foramen magnum extending down, rim enhancement, abutting the left vertebral artery and distorting the 12th nerve.  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 Mcbride. PATH c/w minute fragments of acellular material with rare benign squamous cells. Presented to clinic on 7/22/24 with c/o drainage from surgical incision that started on 7/19/24. Sent to ER.  Now s/p excisional debridement of wound of scalp, removal of hardware on 7/23/24. OR culture showing rare staphylococcus epidermidis, corynebacterium bovis, and cutibacterium.  Completed 2 week course of IV vancomycin on 8/8/24.   She is stable and healing appropriately. She is an appropriate candidate for complete suture removal today.   Instructed to: -continue f/u with Dr. Lucio (ID) -continue f/u with Dr. Mcbride -follow up with Dr. Camp as needed  - Can leave incision open to air. - Gently wash surgical sites daily with baby shampoo and water. Pat dry. - No swimming or soaking in bathtub for 6 weeks post-operatively or until wounds have fully healed. - Avoid applying cream/ointment directly to surgical incisions for 6 weeks post-op. - Continue monitoring for signs of infection including increased pain, redness, swelling, fever (temperature > 100.4 F), or yellow/green/brown drainage. - Please call immediately with any of these symptoms. During office hours, call our office at [phone]. Call 9-1-1 for any life-threatening signs or symptoms. - Follow up with rest of medical team as advised.  Patient verbalized understanding and agreement with treatment plan.

## 2024-08-20 NOTE — ASSESSMENT
[FreeTextEntry1] : 60 yo female w. history of left sided HA involving her left ear on MRI found to have a T2 hyperintense lesion, foramen magnum extending down, rim enhancement, abutting the left vertebral artery and distorting the 12th nerve.  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 Mcbride. PATH c/w minute fragments of acellular material with rare benign squamous cells. Presented to clinic on 7/22/24 with c/o drainage from surgical incision that started on 7/19/24. Sent to ER.  Now s/p excisional debridement of wound of scalp, removal of hardware on 7/23/24. OR culture showing rare staphylococcus epidermidis, corynebacterium bovis, and cutibacterium.  Completed 2 week course of IV vancomycin on 8/8/24.   She is stable and healing appropriately. She is an appropriate candidate for complete suture removal today.   Instructed to: -continue f/u with Dr. Lucio (ID) -continue f/u with Dr. Mcbride -follow up with Dr. Camp as needed  - Can leave incision open to air. - Gently wash surgical sites daily with baby shampoo and water. Pat dry. - No swimming or soaking in bathtub for 6 weeks post-operatively or until wounds have fully healed. - Avoid applying cream/ointment directly to surgical incisions for 6 weeks post-op. - Continue monitoring for signs of infection including increased pain, redness, swelling, fever (temperature > 100.4 F), or yellow/green/brown drainage. - Please call immediately with any of these symptoms. During office hours, call our office at [phone]. Call 9-1-1 for any life-threatening signs or symptoms. - Follow up with rest of medical team as advised.  Patient verbalized understanding and agreement with treatment plan.

## 2024-08-20 NOTE — REASON FOR VISIT
[de-identified] : excisional debridement of wound of scalp including bone [de-identified] : 7/23/24 [de-identified] : 21

## 2024-08-20 NOTE — ASSESSMENT
[FreeTextEntry1] : 58 yo female w. history of left sided HA involving her left ear on MRI found to have a T2 hyperintense lesion, foramen magnum extending down, rim enhancement, abutting the left vertebral artery and distorting the 12th nerve.  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 Mcbride. PATH c/w minute fragments of acellular material with rare benign squamous cells. Presented to clinic on 7/22/24 with c/o drainage from surgical incision that started on 7/19/24. Sent to ER.  Now s/p excisional debridement of wound of scalp, removal of hardware on 7/23/24. OR culture showing rare staphylococcus epidermidis, corynebacterium bovis, and cutibacterium.  Completed 2 week course of IV vancomycin on 8/8/24.   She is stable and healing appropriately. She is an appropriate candidate for complete suture removal today.   Instructed to: -continue f/u with Dr. Lucio (ID) -continue f/u with Dr. Mcbride -follow up with Dr. Camp as needed  - Can leave incision open to air. - Gently wash surgical sites daily with baby shampoo and water. Pat dry. - No swimming or soaking in bathtub for 6 weeks post-operatively or until wounds have fully healed. - Avoid applying cream/ointment directly to surgical incisions for 6 weeks post-op. - Continue monitoring for signs of infection including increased pain, redness, swelling, fever (temperature > 100.4 F), or yellow/green/brown drainage. - Please call immediately with any of these symptoms. During office hours, call our office at [phone]. Call 9-1-1 for any life-threatening signs or symptoms. - Follow up with rest of medical team as advised.  Patient verbalized understanding and agreement with treatment plan.

## 2024-08-20 NOTE — REASON FOR VISIT
[de-identified] : excisional debridement of wound of scalp including bone [de-identified] : 7/23/24 [de-identified] : 21

## 2024-08-20 NOTE — ASSESSMENT
[FreeTextEntry1] : 58 yo female w. history of left sided HA involving her left ear on MRI found to have a T2 hyperintense lesion, foramen magnum extending down, rim enhancement, abutting the left vertebral artery and distorting the 12th nerve.  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 Mcbride. PATH c/w minute fragments of acellular material with rare benign squamous cells. Presented to clinic on 7/22/24 with c/o drainage from surgical incision that started on 7/19/24. Sent to ER.  Now s/p excisional debridement of wound of scalp, removal of hardware on 7/23/24. OR culture showing rare staphylococcus epidermidis, corynebacterium bovis, and cutibacterium.  Completed 2 week course of IV vancomycin on 8/8/24.   She is stable and healing appropriately. She is an appropriate candidate for complete suture removal today.   Instructed to: -continue f/u with Dr. Lucio (ID) -continue f/u with Dr. Mcbride -follow up with Dr. Camp as needed  - Can leave incision open to air. - Gently wash surgical sites daily with baby shampoo and water. Pat dry. - No swimming or soaking in bathtub for 6 weeks post-operatively or until wounds have fully healed. - Avoid applying cream/ointment directly to surgical incisions for 6 weeks post-op. - Continue monitoring for signs of infection including increased pain, redness, swelling, fever (temperature > 100.4 F), or yellow/green/brown drainage. - Please call immediately with any of these symptoms. During office hours, call our office at [phone]. Call 9-1-1 for any life-threatening signs or symptoms. - Follow up with rest of medical team as advised.  Patient verbalized understanding and agreement with treatment plan.

## 2024-08-20 NOTE — REASON FOR VISIT
[de-identified] : excisional debridement of wound of scalp including bone [de-identified] : 7/23/24 [de-identified] : 21

## 2024-08-20 NOTE — PHYSICAL EXAM
[General Appearance - Alert] : alert [General Appearance - In No Acute Distress] : in no acute distress [Clean] : clean [Dry] : dry [Healing Well] : healing well [Sutures Intact] : closed with intact sutures [No Drainage] : without drainage [Normal Skin] : normal [Oriented To Time, Place, And Person] : oriented to person, place, and time [Balance] : balance was intact [Sclera] : the sclera and conjunctiva were normal [Outer Ear] : the ears and nose were normal in appearance [Neck Appearance] : the appearance of the neck was normal [] : no respiratory distress [Abnormal Walk] : normal gait [Skin Color & Pigmentation] : normal skin color and pigmentation [FreeTextEntry1] : L scalp

## 2024-08-20 NOTE — ASSESSMENT
[FreeTextEntry1] : 58 yo female w. history of left sided HA involving her left ear on MRI found to have a T2 hyperintense lesion, foramen magnum extending down, rim enhancement, abutting the left vertebral artery and distorting the 12th nerve.  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 Mcbride. PATH c/w minute fragments of acellular material with rare benign squamous cells. Presented to clinic on 7/22/24 with c/o drainage from surgical incision that started on 7/19/24. Sent to ER.  Now s/p excisional debridement of wound of scalp, removal of hardware on 7/23/24. OR culture showing rare staphylococcus epidermidis, corynebacterium bovis, and cutibacterium.  Completed 2 week course of IV vancomycin on 8/8/24.   She is stable and healing appropriately. She is an appropriate candidate for complete suture removal today.   Instructed to: -continue f/u with Dr. Lucio (ID) -continue f/u with Dr. Mcbride -follow up with Dr. Camp as needed  - Can leave incision open to air. - Gently wash surgical sites daily with baby shampoo and water. Pat dry. - No swimming or soaking in bathtub for 6 weeks post-operatively or until wounds have fully healed. - Avoid applying cream/ointment directly to surgical incisions for 6 weeks post-op. - Continue monitoring for signs of infection including increased pain, redness, swelling, fever (temperature > 100.4 F), or yellow/green/brown drainage. - Please call immediately with any of these symptoms. During office hours, call our office at [phone]. Call 9-1-1 for any life-threatening signs or symptoms. - Follow up with rest of medical team as advised.  Patient verbalized understanding and agreement with treatment plan.

## 2024-08-20 NOTE — HISTORY OF PRESENT ILLNESS
[FreeTextEntry1] : 60 yo female w. history of left sided HA involving her left ear on MRI found to have a T2 hyperintense lesion, foramen magnum extending down, rim enhancement, abutting the left vertebral artery and distorting the 12th nerve.   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 Mcbride.  PATH c/w minute fragments of acellular material with rare benign squamous cells.  Had sutures removed on 7/10/24. Presented to clinic on 7/22/24 with c/o drainage from surgical incision that started on 7/19/24. Sent to ER.  Now s/p excisional debridement of wound of scalp including bone on 7/23/24. OR culture showing rare staphylococcus epidermidis, corynebacterium bovis, and cutibacterium  Completed 2 week course of IV vancomycin on 8/8/24.   8/13/24: Returns today for routine post op f/u. Feeling well, back to baseline. Sleeping ok, exercising with light walking. Denies fevers, chills, drainage.    Scalp: Head shape appears symmetrical. Nylon sutures in place along incision. Scalp incision sites are healing appropriately without erythema, dehiscence, drainage, or signs of infection. Edges are well-approximated. No other erythema. No fluctuance or palpable fluid collections. A&Ox3

## 2024-08-26 ENCOUNTER — APPOINTMENT (OUTPATIENT)
Dept: OTOLARYNGOLOGY | Facility: CLINIC | Age: 59
End: 2024-08-26

## 2024-08-26 PROCEDURE — 92507 TX SP LANG VOICE COMM INDIV: CPT | Mod: GN

## 2024-08-26 PROCEDURE — 92522 EVALUATE SPEECH PRODUCTION: CPT | Mod: GN

## 2024-09-10 ENCOUNTER — APPOINTMENT (OUTPATIENT)
Dept: MRI IMAGING | Facility: HOSPITAL | Age: 59
End: 2024-09-10

## 2024-09-10 ENCOUNTER — OUTPATIENT (OUTPATIENT)
Dept: OUTPATIENT SERVICES | Facility: HOSPITAL | Age: 59
LOS: 1 days | End: 2024-09-10
Payer: COMMERCIAL

## 2024-09-10 DIAGNOSIS — Z98.890 OTHER SPECIFIED POSTPROCEDURAL STATES: Chronic | ICD-10-CM

## 2024-09-10 PROCEDURE — 70553 MRI BRAIN STEM W/O & W/DYE: CPT

## 2024-09-10 PROCEDURE — 70553 MRI BRAIN STEM W/O & W/DYE: CPT | Mod: 26

## 2024-09-10 PROCEDURE — A9585: CPT

## 2024-09-12 ENCOUNTER — APPOINTMENT (OUTPATIENT)
Dept: NEUROSURGERY | Facility: CLINIC | Age: 59
End: 2024-09-12
Payer: COMMERCIAL

## 2024-09-12 VITALS
DIASTOLIC BLOOD PRESSURE: 89 MMHG | SYSTOLIC BLOOD PRESSURE: 159 MMHG | HEART RATE: 64 BPM | RESPIRATION RATE: 18 BRPM | TEMPERATURE: 97.7 F | HEIGHT: 64 IN | OXYGEN SATURATION: 98 % | WEIGHT: 128 LBS | BODY MASS INDEX: 21.85 KG/M2

## 2024-09-12 DIAGNOSIS — Z98.890 OTHER SPECIFIED POSTPROCEDURAL STATES: ICD-10-CM

## 2024-09-12 DIAGNOSIS — M89.9 DISORDER OF BONE, UNSPECIFIED: ICD-10-CM

## 2024-09-12 DIAGNOSIS — M89.8X8 OTHER SPECIFIED DISORDERS OF BONE, OTHER SITE: ICD-10-CM

## 2024-09-12 DIAGNOSIS — K13.79 OTHER LESIONS OF ORAL MUCOSA: ICD-10-CM

## 2024-09-12 PROCEDURE — 99024 POSTOP FOLLOW-UP VISIT: CPT

## 2024-09-13 NOTE — ADDENDUM
[FreeTextEntry1] : Patient Name: REINALDO YI  Chief Complaint (CC): - Post operative check-up after recent surgery  History of Present Illness: - The patient, having recently undergone surgery in June, reported feeling better after the operation. Speech has improved showcased by exercises from the speech therapist. Post-surgery MRI shows a decrease in enhancement and reduction in air pockets size compared to pre-surgery MRI from July. The bacteria causing infection earlier was cured with appropriate antibiotics over some weeks. Complained of lingering numbness and pressure sensation. Also reported increased sensitivity around the ear.  Past Medical History (PMH): - Underwent a surgery to treat a cyst in June. Post-operative enhancements were noted in MRI from July.  Family History (FH): -  Social History (SH): - The patient lives with family and plans to go on a long car ride. She also intends to return to work soon.  Medications: - Had undergone antibiotics treatment post surgery.  Allergies: -  Review of Systems (ROS): -  Physical Examination (PE): - The patient is feeling well neurologically. No signs of fever observed. The tongue appears to be recovering, and speech is improved. The surgical wound is healing well.  Laboratory/Data Review: - According to the recent MRI, the enhancement and air pockets have reduced significantly compared to the MRI in July. There are no concerning signs such as new pockets or anything appearing worse. The pressure on the brainstem and vertebral artery is now gone, and the cyst site is a conspicuous dark hole signifying successful surgery. Cultured samples from the patient showed squiggle bacteria.  Assessment: - The patient's recovery post-surgery is going well. The MRI indicates a positive response to the surgery. The infection is now under control after the antibiotic therapy.  Plan: - The patient will undergo another MRI in December. This is more with respect to checking for cyst recurrence rather than to investigate the infection. The patient is advised to expect some residual numbness over the next six to twelve months. The patient is given clearance to go on a long car ride, color her hair, and also to return to work.  Preventive Health: -

## 2024-09-13 NOTE — ASSESSMENT
[FreeTextEntry1] : I, Dr. Mcbride, personally performed the evaluation and management (E/M) services for this established patient who presents today with (an) existing condition(s). That E/M includes conducting the examination, assessing all new/exacerbated conditions, and establishing a new plan of care. Today, my ACP, Purnima Harper, was here to observe my evaluation and management services for this new problem/exacerbated condition to be followed going forward.  PLAN: -MRI December without contrast

## 2024-09-13 NOTE — REASON FOR VISIT
[de-identified] : excisional debridement of wound of scalp including bone [de-identified] : 7/23/24 [de-identified] : 21

## 2024-09-13 NOTE — PHYSICAL EXAM
[General Appearance - Alert] : alert [General Appearance - In No Acute Distress] : in no acute distress [Clean] : clean [Dry] : dry [Healing Well] : healing well [Sutures Intact] : closed with intact sutures [No Drainage] : without drainage [Normal Skin] : normal [Oriented To Time, Place, And Person] : oriented to person, place, and time [Balance] : balance was intact [Sclera] : the sclera and conjunctiva were normal [Outer Ear] : the ears and nose were normal in appearance [Neck Appearance] : the appearance of the neck was normal [] : no respiratory distress [Abnormal Walk] : normal gait [Skin Color & Pigmentation] : normal skin color and pigmentation [Cranial Nerves Optic (II)] : visual acuity intact bilaterally,  pupils equal round and reactive to light [Cranial Nerves Oculomotor (III)] : extraocular motion intact [Cranial Nerves Trigeminal (V)] : facial sensation intact symmetrically [Cranial Nerves Facial (VII)] : face symmetrical [Cranial Nerves Vestibulocochlear (VIII)] : hearing was intact bilaterally [Cranial Nerves Accessory (XI - Cranial And Spinal)] : head turning and shoulder shrug symmetric [Cranial Nerves Hypoglossal (XII)] : there was no tongue deviation with protrusion [Motor Tone] : muscle tone was normal in all four extremities [Motor Strength] : muscle strength was normal in all four extremities [FreeTextEntry1] : L scalp

## 2024-09-13 NOTE — REASON FOR VISIT
[de-identified] : excisional debridement of wound of scalp including bone [de-identified] : 7/23/24 [de-identified] : 21

## 2024-09-13 NOTE — REASON FOR VISIT
[de-identified] : excisional debridement of wound of scalp including bone [de-identified] : 7/23/24 [de-identified] : 21

## 2024-09-13 NOTE — HISTORY OF PRESENT ILLNESS
[FreeTextEntry1] : 58 yo female w. history of left sided HA involving her left ear on MRI found to have a T2 hyperintense lesion, foramen magnum extending down, rim enhancement, abutting the left vertebral artery and distorting the 12th nerve.   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 Mcbride.  PATH c/w minute fragments of acellular material with rare benign squamous cells.  Had sutures removed on 7/10/24. Presented to clinic on 7/22/24 with c/o drainage from surgical incision that started on 7/19/24. Sent to ER.  Now s/p excisional debridement of wound of scalp including bone on 7/23/24. OR culture showing rare staphylococcus epidermidis, corynebacterium bovis, and cutibacterium  Completed 2 week course of IV vancomycin on 8/8/24.   8/13/24: Returns today for routine post op f/u. Feeling well, back to baseline. Sleeping ok, exercising with light walking. Denies fevers, chills, drainage.    Scalp: Head shape appears symmetrical. Nylon sutures in place along incision. Scalp incision sites are healing appropriately without erythema, dehiscence, drainage, or signs of infection. Edges are well-approximated. No other erythema. No fluctuance or palpable fluid collections. A&Ox3  TODAY 9/12/24: RTC post-op visit with imaging ot review

## 2024-09-16 ENCOUNTER — TRANSCRIPTION ENCOUNTER (OUTPATIENT)
Age: 59
End: 2024-09-16

## 2024-09-18 ENCOUNTER — APPOINTMENT (OUTPATIENT)
Dept: OTOLARYNGOLOGY | Facility: CLINIC | Age: 59
End: 2024-09-18

## 2024-12-05 ENCOUNTER — OUTPATIENT (OUTPATIENT)
Dept: OUTPATIENT SERVICES | Facility: HOSPITAL | Age: 59
LOS: 1 days | End: 2024-12-05
Payer: COMMERCIAL

## 2024-12-05 ENCOUNTER — APPOINTMENT (OUTPATIENT)
Dept: MRI IMAGING | Facility: HOSPITAL | Age: 59
End: 2024-12-05

## 2024-12-05 DIAGNOSIS — Z98.890 OTHER SPECIFIED POSTPROCEDURAL STATES: Chronic | ICD-10-CM

## 2024-12-05 PROCEDURE — 70551 MRI BRAIN STEM W/O DYE: CPT | Mod: 26

## 2024-12-05 PROCEDURE — 70551 MRI BRAIN STEM W/O DYE: CPT

## 2024-12-06 ENCOUNTER — NON-APPOINTMENT (OUTPATIENT)
Age: 59
End: 2024-12-06

## 2024-12-12 ENCOUNTER — APPOINTMENT (OUTPATIENT)
Dept: NEUROSURGERY | Facility: CLINIC | Age: 59
End: 2024-12-12

## 2025-03-13 ENCOUNTER — APPOINTMENT (OUTPATIENT)
Dept: MRI IMAGING | Facility: HOSPITAL | Age: 60
End: 2025-03-13

## 2025-03-13 ENCOUNTER — OUTPATIENT (OUTPATIENT)
Dept: OUTPATIENT SERVICES | Facility: HOSPITAL | Age: 60
LOS: 1 days | End: 2025-03-13
Payer: COMMERCIAL

## 2025-03-13 DIAGNOSIS — Z98.890 OTHER SPECIFIED POSTPROCEDURAL STATES: Chronic | ICD-10-CM

## 2025-03-13 PROCEDURE — 70553 MRI BRAIN STEM W/O & W/DYE: CPT

## 2025-03-13 PROCEDURE — 70553 MRI BRAIN STEM W/O & W/DYE: CPT | Mod: 26

## 2025-03-13 PROCEDURE — A9585: CPT

## 2025-03-21 ENCOUNTER — APPOINTMENT (OUTPATIENT)
Dept: NEUROSURGERY | Facility: CLINIC | Age: 60
End: 2025-03-21
Payer: COMMERCIAL

## 2025-03-21 VITALS
HEIGHT: 64 IN | SYSTOLIC BLOOD PRESSURE: 131 MMHG | WEIGHT: 130 LBS | OXYGEN SATURATION: 98 % | RESPIRATION RATE: 18 BRPM | HEART RATE: 68 BPM | DIASTOLIC BLOOD PRESSURE: 83 MMHG | BODY MASS INDEX: 22.2 KG/M2

## 2025-03-21 DIAGNOSIS — Z78.9 OTHER SPECIFIED HEALTH STATUS: ICD-10-CM

## 2025-03-21 DIAGNOSIS — R47.1 DYSARTHRIA AND ANARTHRIA: ICD-10-CM

## 2025-03-21 DIAGNOSIS — M89.8X8 OTHER SPECIFIED DISORDERS OF BONE, OTHER SITE: ICD-10-CM

## 2025-03-21 DIAGNOSIS — Z98.890 OTHER SPECIFIED POSTPROCEDURAL STATES: ICD-10-CM

## 2025-03-21 DIAGNOSIS — M89.9 DISORDER OF BONE, UNSPECIFIED: ICD-10-CM

## 2025-03-21 DIAGNOSIS — G52.9 CRANIAL NERVE DISORDER, UNSPECIFIED: ICD-10-CM

## 2025-03-21 PROCEDURE — 99214 OFFICE O/P EST MOD 30 MIN: CPT

## 2025-06-15 ENCOUNTER — OUTPATIENT (OUTPATIENT)
Dept: OUTPATIENT SERVICES | Facility: HOSPITAL | Age: 60
LOS: 1 days | End: 2025-06-15
Payer: COMMERCIAL

## 2025-06-15 DIAGNOSIS — Z98.890 OTHER SPECIFIED POSTPROCEDURAL STATES: Chronic | ICD-10-CM

## 2025-06-15 PROCEDURE — A9585: CPT

## 2025-06-15 PROCEDURE — 70553 MRI BRAIN STEM W/O & W/DYE: CPT

## 2025-06-15 PROCEDURE — 70553 MRI BRAIN STEM W/O & W/DYE: CPT | Mod: 26

## 2025-06-19 ENCOUNTER — APPOINTMENT (OUTPATIENT)
Dept: NEUROSURGERY | Facility: CLINIC | Age: 60
End: 2025-06-19

## 2025-06-19 ENCOUNTER — NON-APPOINTMENT (OUTPATIENT)
Age: 60
End: 2025-06-19

## 2025-06-19 VITALS
SYSTOLIC BLOOD PRESSURE: 123 MMHG | OXYGEN SATURATION: 98 % | HEIGHT: 64 IN | WEIGHT: 130 LBS | DIASTOLIC BLOOD PRESSURE: 79 MMHG | RESPIRATION RATE: 18 BRPM | BODY MASS INDEX: 22.2 KG/M2 | HEART RATE: 73 BPM

## 2025-06-19 PROCEDURE — 99213 OFFICE O/P EST LOW 20 MIN: CPT

## 2025-07-06 ENCOUNTER — OUTPATIENT (OUTPATIENT)
Dept: OUTPATIENT SERVICES | Facility: HOSPITAL | Age: 60
LOS: 1 days | End: 2025-07-06
Payer: COMMERCIAL

## 2025-07-06 DIAGNOSIS — Z98.890 OTHER SPECIFIED POSTPROCEDURAL STATES: Chronic | ICD-10-CM

## 2025-07-06 PROCEDURE — 70450 CT HEAD/BRAIN W/O DYE: CPT

## 2025-07-20 ENCOUNTER — OUTPATIENT (OUTPATIENT)
Dept: OUTPATIENT SERVICES | Facility: HOSPITAL | Age: 60
LOS: 1 days | End: 2025-07-20
Payer: COMMERCIAL

## 2025-07-20 DIAGNOSIS — Z98.890 OTHER SPECIFIED POSTPROCEDURAL STATES: Chronic | ICD-10-CM

## 2025-07-20 PROCEDURE — 70450 CT HEAD/BRAIN W/O DYE: CPT

## (undated) DEVICE — WARMING BLANKET FULL UNDERBODY

## (undated) DEVICE — AESCULAP SCALPFIX 10 CLIPS

## (undated) DEVICE — ELCTR STRYKER NEPTUNE SMOKE EVACUATION PENCIL (GREEN)

## (undated) DEVICE — SPONGE SURGICAL STRIP 1" X 6"

## (undated) DEVICE — Device

## (undated) DEVICE — LONE STAR ELASTIC STAY HOOK 12MM BLUNT

## (undated) DEVICE — SPONGE SURGICAL STRIP 1/2 X 6"

## (undated) DEVICE — POSITIONER FOAM EGG CRATE ULNAR 2PCS (PINK)

## (undated) DEVICE — DRAIN RESERVOIR FOR JACKSON PRATT 100CC CARDINAL

## (undated) DEVICE — SUT VICRYL PLUS 2-0 18" CT-2 (POP-OFF)

## (undated) DEVICE — SUT ETHILON 2-0 18" PS-2

## (undated) DEVICE — PREP BETADINE KIT

## (undated) DEVICE — DRAPE MICROSCOPE EXOSCOPE 12" X 79"

## (undated) DEVICE — BUR ROUTER MED

## (undated) DEVICE — NEPTUNE II 4-PORT MANIFOLD

## (undated) DEVICE — STRYKER INSTRUMENT BATTERY

## (undated) DEVICE — ARACHNOID BACKCUTTING SUPERFICIAL HANDLE 5"

## (undated) DEVICE — NDL SPINAL 18G X 3.5" (PINK)

## (undated) DEVICE — BIPOLAR FORCEP KOGENT IRRIGATING STRAIGHT 0.5MM X 8" DISP

## (undated) DEVICE — RUBBERBAND STRL LTX FR 200/CA 3X1/8IN

## (undated) DEVICE — ARACHNOID BACKCUTTING LONG HANDLE 8"

## (undated) DEVICE — DRAPE MAYO STAND 30"

## (undated) DEVICE — SPONGE SURGICAL STRIP 1/4 X 6"

## (undated) DEVICE — BIPOLAR FORCEP SYMMETRY BAYONET 7" X 1.5MM SMOOTH (SILVER)

## (undated) DEVICE — BIPOLAR FORCEP KOGENT IRRIGATING STRAIGHT 0.5MM X 7" DISP

## (undated) DEVICE — DRAPE LIGHT HANDLE COVER (GREEN)

## (undated) DEVICE — PACK CRANIOTOMY LNX SURGICOUNT

## (undated) DEVICE — DRAPE TOWEL BLUE 17" X 24"

## (undated) DEVICE — FOLEY TRAY 16FR 5CC LF UMETER CLOSED

## (undated) DEVICE — SUT NUROLON 4-0 8-18" TF (POP-OFF)

## (undated) DEVICE — GLV 8 PROTEXIS (WHITE)